# Patient Record
Sex: FEMALE | Race: WHITE | NOT HISPANIC OR LATINO | URBAN - METROPOLITAN AREA
[De-identification: names, ages, dates, MRNs, and addresses within clinical notes are randomized per-mention and may not be internally consistent; named-entity substitution may affect disease eponyms.]

---

## 2021-02-23 ENCOUNTER — OUTPATIENT (OUTPATIENT)
Dept: OUTPATIENT SERVICES | Facility: HOSPITAL | Age: 31
LOS: 1 days | End: 2021-02-23
Payer: COMMERCIAL

## 2021-02-23 DIAGNOSIS — Z01.818 ENCOUNTER FOR OTHER PREPROCEDURAL EXAMINATION: ICD-10-CM

## 2021-02-23 LAB
APTT BLD: 23.9 SEC — LOW (ref 27.5–35.5)
BLD GP AB SCN SERPL QL: NEGATIVE — SIGNIFICANT CHANGE UP
BLD GP AB SCN SERPL QL: NEGATIVE — SIGNIFICANT CHANGE UP
HCT VFR BLD CALC: 40.1 % — SIGNIFICANT CHANGE UP (ref 34.5–45)
HGB BLD-MCNC: 13.1 G/DL — SIGNIFICANT CHANGE UP (ref 11.5–15.5)
INR BLD: 0.83 — LOW (ref 0.88–1.16)
MCHC RBC-ENTMCNC: 32.5 PG — SIGNIFICANT CHANGE UP (ref 27–34)
MCHC RBC-ENTMCNC: 32.7 GM/DL — SIGNIFICANT CHANGE UP (ref 32–36)
MCV RBC AUTO: 99.5 FL — SIGNIFICANT CHANGE UP (ref 80–100)
NRBC # BLD: 0 /100 WBCS — SIGNIFICANT CHANGE UP (ref 0–0)
PLATELET # BLD AUTO: 192 K/UL — SIGNIFICANT CHANGE UP (ref 150–400)
PROTHROM AB SERPL-ACNC: 10.1 SEC — LOW (ref 10.6–13.6)
RBC # BLD: 4.03 M/UL — SIGNIFICANT CHANGE UP (ref 3.8–5.2)
RBC # FLD: 11.5 % — SIGNIFICANT CHANGE UP (ref 10.3–14.5)
RH IG SCN BLD-IMP: POSITIVE — SIGNIFICANT CHANGE UP
RH IG SCN BLD-IMP: POSITIVE — SIGNIFICANT CHANGE UP
WBC # BLD: 8.69 K/UL — SIGNIFICANT CHANGE UP (ref 3.8–10.5)
WBC # FLD AUTO: 8.69 K/UL — SIGNIFICANT CHANGE UP (ref 3.8–10.5)

## 2021-02-23 PROCEDURE — 86780 TREPONEMA PALLIDUM: CPT

## 2021-02-23 PROCEDURE — 86850 RBC ANTIBODY SCREEN: CPT

## 2021-02-23 PROCEDURE — 85610 PROTHROMBIN TIME: CPT

## 2021-02-23 PROCEDURE — 86901 BLOOD TYPING SEROLOGIC RH(D): CPT

## 2021-02-23 PROCEDURE — 86900 BLOOD TYPING SEROLOGIC ABO: CPT

## 2021-02-23 PROCEDURE — 85027 COMPLETE CBC AUTOMATED: CPT

## 2021-02-23 PROCEDURE — 85730 THROMBOPLASTIN TIME PARTIAL: CPT

## 2021-02-24 LAB — T PALLIDUM AB TITR SER: NEGATIVE — SIGNIFICANT CHANGE UP

## 2021-02-25 ENCOUNTER — TRANSCRIPTION ENCOUNTER (OUTPATIENT)
Age: 31
End: 2021-02-25

## 2021-02-26 ENCOUNTER — RESULT REVIEW (OUTPATIENT)
Age: 31
End: 2021-02-26

## 2021-02-26 ENCOUNTER — INPATIENT (INPATIENT)
Facility: HOSPITAL | Age: 31
LOS: 3 days | Discharge: HOME | End: 2021-03-02
Attending: OBSTETRICS & GYNECOLOGY | Admitting: OBSTETRICS & GYNECOLOGY
Payer: COMMERCIAL

## 2021-02-26 VITALS — HEIGHT: 66 IN | WEIGHT: 166.01 LBS

## 2021-02-26 LAB
ALBUMIN SERPL ELPH-MCNC: 1.9 G/DL — LOW (ref 3.3–5)
ALP SERPL-CCNC: 129 U/L — HIGH (ref 40–120)
ALT FLD-CCNC: SIGNIFICANT CHANGE UP (ref 10–45)
ANION GAP SERPL CALC-SCNC: 10 MMOL/L — SIGNIFICANT CHANGE UP (ref 5–17)
APTT BLD: >200 SEC — CRITICAL HIGH (ref 27.5–35.5)
AST SERPL-CCNC: SIGNIFICANT CHANGE UP (ref 10–40)
BASE EXCESS BLDA CALC-SCNC: -2.6 MMOL/L — LOW (ref -2–3)
BASOPHILS # BLD AUTO: 0.04 K/UL — SIGNIFICANT CHANGE UP (ref 0–0.2)
BASOPHILS NFR BLD AUTO: 0.3 % — SIGNIFICANT CHANGE UP (ref 0–2)
BILIRUB SERPL-MCNC: 0.2 MG/DL — SIGNIFICANT CHANGE UP (ref 0.2–1.2)
BLD GP AB SCN SERPL QL: NEGATIVE — SIGNIFICANT CHANGE UP
BUN SERPL-MCNC: 10 MG/DL — SIGNIFICANT CHANGE UP (ref 7–23)
CALCIUM SERPL-MCNC: 8.4 MG/DL — SIGNIFICANT CHANGE UP (ref 8.4–10.5)
CHLORIDE SERPL-SCNC: 102 MMOL/L — SIGNIFICANT CHANGE UP (ref 96–108)
CK MB CFR SERPL CALC: 1.6 NG/ML — SIGNIFICANT CHANGE UP (ref 0–6.7)
CK SERPL-CCNC: 120 U/L — SIGNIFICANT CHANGE UP (ref 25–170)
CO2 SERPL-SCNC: 20 MMOL/L — LOW (ref 22–31)
CREAT SERPL-MCNC: 0.8 MG/DL — SIGNIFICANT CHANGE UP (ref 0.5–1.3)
EOSINOPHIL # BLD AUTO: 0.04 K/UL — SIGNIFICANT CHANGE UP (ref 0–0.5)
EOSINOPHIL NFR BLD AUTO: 0.3 % — SIGNIFICANT CHANGE UP (ref 0–6)
FIBRINOGEN PPP-MCNC: 401 MG/DL — SIGNIFICANT CHANGE UP (ref 258–438)
GAS PNL BLDA: SIGNIFICANT CHANGE UP
GLUCOSE BLDC GLUCOMTR-MCNC: 78 MG/DL — SIGNIFICANT CHANGE UP (ref 70–99)
GLUCOSE BLDC GLUCOMTR-MCNC: 88 MG/DL — SIGNIFICANT CHANGE UP (ref 70–99)
GLUCOSE SERPL-MCNC: 102 MG/DL — HIGH (ref 70–99)
HCO3 BLDA-SCNC: 22 MMOL/L — SIGNIFICANT CHANGE UP (ref 21–28)
HCT VFR BLD CALC: 31.8 % — LOW (ref 34.5–45)
HCT VFR BLD CALC: 35 % — SIGNIFICANT CHANGE UP (ref 34.5–45)
HGB BLD-MCNC: 11.2 G/DL — LOW (ref 11.5–15.5)
HGB BLD-MCNC: 12.8 G/DL — SIGNIFICANT CHANGE UP (ref 11.5–15.5)
IMM GRANULOCYTES NFR BLD AUTO: 0.7 % — SIGNIFICANT CHANGE UP (ref 0–1.5)
INR BLD: 0.9 — SIGNIFICANT CHANGE UP (ref 0.88–1.16)
LACTATE SERPL-SCNC: 1.5 MMOL/L — SIGNIFICANT CHANGE UP (ref 0.5–2)
LDH SERPL L TO P-CCNC: 200 U/L — SIGNIFICANT CHANGE UP (ref 50–242)
LYMPHOCYTES # BLD AUTO: 1.47 K/UL — SIGNIFICANT CHANGE UP (ref 1–3.3)
LYMPHOCYTES # BLD AUTO: 11.5 % — LOW (ref 13–44)
MCHC RBC-ENTMCNC: 34.4 PG — HIGH (ref 27–34)
MCHC RBC-ENTMCNC: 35.2 GM/DL — SIGNIFICANT CHANGE UP (ref 32–36)
MCHC RBC-ENTMCNC: 35.7 PG — HIGH (ref 27–34)
MCHC RBC-ENTMCNC: 36.6 GM/DL — HIGH (ref 32–36)
MCV RBC AUTO: 97.5 FL — SIGNIFICANT CHANGE UP (ref 80–100)
MCV RBC AUTO: 97.5 FL — SIGNIFICANT CHANGE UP (ref 80–100)
MONOCYTES # BLD AUTO: 0.59 K/UL — SIGNIFICANT CHANGE UP (ref 0–0.9)
MONOCYTES NFR BLD AUTO: 4.6 % — SIGNIFICANT CHANGE UP (ref 2–14)
NEUTROPHILS # BLD AUTO: 10.59 K/UL — HIGH (ref 1.8–7.4)
NEUTROPHILS NFR BLD AUTO: 82.6 % — HIGH (ref 43–77)
NRBC # BLD: 0 /100 WBCS — SIGNIFICANT CHANGE UP (ref 0–0)
NRBC # BLD: 0 /100 WBCS — SIGNIFICANT CHANGE UP (ref 0–0)
PCO2 BLDA: 36 MMHG — SIGNIFICANT CHANGE UP (ref 32–45)
PH BLDA: 7.4 — SIGNIFICANT CHANGE UP (ref 7.35–7.45)
PLATELET # BLD AUTO: 212 K/UL — SIGNIFICANT CHANGE UP (ref 150–400)
PLATELET # BLD AUTO: 221 K/UL — SIGNIFICANT CHANGE UP (ref 150–400)
PO2 BLDA: 210 MMHG — HIGH (ref 83–108)
POTASSIUM SERPL-MCNC: 4.3 MMOL/L — SIGNIFICANT CHANGE UP (ref 3.5–5.3)
POTASSIUM SERPL-SCNC: 4.3 MMOL/L — SIGNIFICANT CHANGE UP (ref 3.5–5.3)
PROT SERPL-MCNC: 5.1 G/DL — LOW (ref 6–8.3)
PROTHROM AB SERPL-ACNC: 10.9 SEC — SIGNIFICANT CHANGE UP (ref 10.6–13.6)
RBC # BLD: 3.26 M/UL — LOW (ref 3.8–5.2)
RBC # BLD: 3.59 M/UL — LOW (ref 3.8–5.2)
RBC # FLD: 11.2 % — SIGNIFICANT CHANGE UP (ref 10.3–14.5)
RBC # FLD: 11.5 % — SIGNIFICANT CHANGE UP (ref 10.3–14.5)
RH IG SCN BLD-IMP: POSITIVE — SIGNIFICANT CHANGE UP
SAO2 % BLDA: 99 % — SIGNIFICANT CHANGE UP (ref 95–100)
SODIUM SERPL-SCNC: 132 MMOL/L — LOW (ref 135–145)
TROPONIN T SERPL-MCNC: <0.01 NG/ML — SIGNIFICANT CHANGE UP (ref 0–0.01)
URATE SERPL-MCNC: 4.7 MG/DL — SIGNIFICANT CHANGE UP (ref 2.5–7)
WBC # BLD: 12.82 K/UL — HIGH (ref 3.8–10.5)
WBC # BLD: 14.35 K/UL — HIGH (ref 3.8–10.5)
WBC # FLD AUTO: 12.82 K/UL — HIGH (ref 3.8–10.5)
WBC # FLD AUTO: 14.35 K/UL — HIGH (ref 3.8–10.5)

## 2021-02-26 PROCEDURE — 88307 TISSUE EXAM BY PATHOLOGIST: CPT | Mod: 26

## 2021-02-26 PROCEDURE — 0042T: CPT

## 2021-02-26 PROCEDURE — 70498 CT ANGIOGRAPHY NECK: CPT | Mod: 26

## 2021-02-26 PROCEDURE — 70450 CT HEAD/BRAIN W/O DYE: CPT | Mod: 26,59

## 2021-02-26 PROCEDURE — 70496 CT ANGIOGRAPHY HEAD: CPT | Mod: 26

## 2021-02-26 RX ORDER — MAGNESIUM SULFATE 500 MG/ML
4 VIAL (ML) INJECTION ONCE
Refills: 0 | Status: DISCONTINUED | OUTPATIENT
Start: 2021-02-26 | End: 2021-02-26

## 2021-02-26 RX ORDER — FAMOTIDINE 10 MG/ML
20 INJECTION INTRAVENOUS ONCE
Refills: 0 | Status: COMPLETED | OUTPATIENT
Start: 2021-02-26 | End: 2021-02-26

## 2021-02-26 RX ORDER — MAGNESIUM SULFATE 500 MG/ML
2 VIAL (ML) INJECTION
Qty: 40 | Refills: 0 | Status: DISCONTINUED | OUTPATIENT
Start: 2021-02-26 | End: 2021-02-27

## 2021-02-26 RX ORDER — MAGNESIUM SULFATE 500 MG/ML
2 VIAL (ML) INJECTION ONCE
Refills: 0 | Status: DISCONTINUED | OUTPATIENT
Start: 2021-02-26 | End: 2021-02-27

## 2021-02-26 RX ORDER — OXYTOCIN 10 UNIT/ML
333.33 VIAL (ML) INJECTION
Qty: 20 | Refills: 0 | Status: COMPLETED | OUTPATIENT
Start: 2021-02-26 | End: 2021-02-26

## 2021-02-26 RX ORDER — CITRIC ACID/SODIUM CITRATE 300-500 MG
30 SOLUTION, ORAL ORAL ONCE
Refills: 0 | Status: COMPLETED | OUTPATIENT
Start: 2021-02-26 | End: 2021-02-26

## 2021-02-26 RX ORDER — CEFAZOLIN SODIUM 1 G
2000 VIAL (EA) INJECTION ONCE
Refills: 0 | Status: COMPLETED | OUTPATIENT
Start: 2021-02-26 | End: 2021-02-26

## 2021-02-26 RX ORDER — CHLORHEXIDINE GLUCONATE 213 G/1000ML
1 SOLUTION TOPICAL
Refills: 0 | Status: DISCONTINUED | OUTPATIENT
Start: 2021-02-26 | End: 2021-02-27

## 2021-02-26 RX ORDER — MAGNESIUM SULFATE 500 MG/ML
4 VIAL (ML) INJECTION ONCE
Refills: 0 | Status: DISCONTINUED | OUTPATIENT
Start: 2021-02-26 | End: 2021-02-27

## 2021-02-26 RX ORDER — SODIUM CHLORIDE 9 MG/ML
1000 INJECTION, SOLUTION INTRAVENOUS
Refills: 0 | Status: DISCONTINUED | OUTPATIENT
Start: 2021-02-26 | End: 2021-02-27

## 2021-02-26 RX ORDER — METOCLOPRAMIDE HCL 10 MG
10 TABLET ORAL ONCE
Refills: 0 | Status: DISCONTINUED | OUTPATIENT
Start: 2021-02-26 | End: 2021-02-27

## 2021-02-26 RX ORDER — INFLUENZA VIRUS VACCINE 15; 15; 15; 15 UG/.5ML; UG/.5ML; UG/.5ML; UG/.5ML
0.5 SUSPENSION INTRAMUSCULAR ONCE
Refills: 0 | Status: DISCONTINUED | OUTPATIENT
Start: 2021-02-26 | End: 2021-03-02

## 2021-02-26 RX ORDER — MAGNESIUM SULFATE 500 MG/ML
2 VIAL (ML) INJECTION
Qty: 40 | Refills: 0 | Status: DISCONTINUED | OUTPATIENT
Start: 2021-02-26 | End: 2021-02-26

## 2021-02-26 RX ORDER — MAGNESIUM SULFATE 500 MG/ML
2 VIAL (ML) INJECTION ONCE
Refills: 0 | Status: DISCONTINUED | OUTPATIENT
Start: 2021-02-26 | End: 2021-02-26

## 2021-02-26 RX ORDER — BUPIVACAINE 13.3 MG/ML
20 INJECTION, SUSPENSION, LIPOSOMAL INFILTRATION ONCE
Refills: 0 | Status: DISCONTINUED | OUTPATIENT
Start: 2021-02-26 | End: 2021-03-02

## 2021-02-26 RX ORDER — SODIUM CHLORIDE 9 MG/ML
1000 INJECTION, SOLUTION INTRAVENOUS ONCE
Refills: 0 | Status: COMPLETED | OUTPATIENT
Start: 2021-02-26 | End: 2021-02-26

## 2021-02-26 RX ADMIN — FAMOTIDINE 20 MILLIGRAM(S): 10 INJECTION INTRAVENOUS at 15:27

## 2021-02-26 RX ADMIN — SODIUM CHLORIDE 2000 MILLILITER(S): 9 INJECTION, SOLUTION INTRAVENOUS at 13:42

## 2021-02-26 RX ADMIN — SODIUM CHLORIDE 125 MILLILITER(S): 9 INJECTION, SOLUTION INTRAVENOUS at 15:15

## 2021-02-26 RX ADMIN — Medication 1000 MILLIUNIT(S)/MIN: at 19:00

## 2021-02-26 RX ADMIN — Medication 30 MILLILITER(S): at 15:27

## 2021-02-26 RX ADMIN — Medication 100 MILLIGRAM(S): at 15:27

## 2021-02-26 NOTE — CHART NOTE - NSCHARTNOTEFT_GEN_A_CORE
31yo  had uncomplicated c/s for breech presentation with spinal anesthesia, EBL 800cc. Pt received additional sedation by anesthesia (versed) for discomfort after delivery of baby and for abdominal muscle relaxation to better visualize operative field. Pt was given TAP Block (with reglan) while in the OR for additional pain control. During and for several minutes after the TAP Block, pt was conversational and alert and oriented x 3. While preparing move patient to PACU bed to leave OR, pt reported that she felt "weird." She denied feeling dizzy or hearing ringing in her ears. Patient was moved to PACU bed. Within seconds patient's eyes became unfocused and she became incoherent in her speech. She was quickly moved to PACU and vitals were taken. At 17:10 she had elevated BP with systolic in high 130's. She then became completely unresponsive - eyes open but unable to follow commands, answer questions, or make eye contact. Pt was saturating 97% on RA but was started on O2. Dr. Licona and Dr. Meeks were present at bedside. Dr. Chowdhury called Dr. Ng was called to bedside, who then quickly called Dr. Cotto. At 17:15, a stoke code and adult rapid response were called. Within 5 minutes the teams arrived. Initial fingerstick was 88. At 17:25 BP was 145/75, at this time patient received interlipid bolus. At 17:30, CT stroke protocol was ordered and full labs were ordered. At 17:33, CT on 3rd floor confirmed they were open and expecting us. At 17:36 labs were run up to the lab.  At 17:38, team left for CT scanner. During this time, pt was never hemodynamically unstable. Pt continued to be unresponsive to verbal or tactile stimuli, including to pain. At 17:41, arrived at CT scanner. SICU team was called and bed was confirmed. By the time the CTs (head noncontrast, angio) were completed at 18:05, pt began to become responsive with slurred words and word finding difficulty. Pt was able to confirm her birthday while in the hallway outside CT scanner. At this time, horizontal and vertical nystagmus were elicited. Pt started following commands. Pt was brought up to SICU and at this time pt was following all commands, although she continued to have word finding difficulties and was acting altered/"loopy". Fundus was firm with minimal lochia.  updated on pt's status by Dr. Gao. Signed out to SICU with plan to start another bag of pitocin. Will continue to follow patient in SICU.

## 2021-02-26 NOTE — LACTATION INITIAL EVALUATION - LACTATION INTERVENTIONS
Set up with breast pump and provided education accordingly./initiate hand expression routine/initiate dual electric pump routine

## 2021-02-26 NOTE — LACTATION INITIAL EVALUATION - INTERVENTION OUTCOME
Follow up needed to complete breastfeeding education./verbalizes understanding/demonstrates understanding of teaching/good return demonstration/Lactation team to follow up

## 2021-02-26 NOTE — CONSULT NOTE ADULT - SUBJECTIVE AND OBJECTIVE BOX
SICU Consultation Note  =====================================================  HPI: 30y Female  HPI: 31 y/o F  with no PMH, presents to the hospital for scheduled  for breech presentation. Pregnancy was complicated with GDM. During  she received spinal anesthesia and TAP. While in PACU she immediately became obtunded with slurred speech, while maintaining her airway. At 17:10 she had elevated BP with systolic in high 130's. She then became completely unresponsive - eyes open but unable to follow commands, answer questions, or make eye contact. Pt was saturating 97% on RA but was started on O2,. Stroke code was called and patient had a full set of labs and was taken to CT scan of head and neck with no findings. Patient admitted to SICU for close monitoring overnight.        PAST MEDICAL & SURGICAL HISTORY:    Home Meds: Home Medications:  levothyroxine 88 mcg (0.088 mg) oral tablet: 1 tab(s) orally once a day (2021 15:21)  Prenatal 1 oral capsule: 1 cap(s) orally once a day (2021 15:21)    Allergies: Allergies    Allergy Status Unknown    Intolerances      Soc:   Advanced Directives: Presumed Full Code     ROS:    REVIEW OF SYSTEMS    [x] A ten-point review of systems was otherwise negative except as noted.        CURRENT MEDICATIONS:   --------------------------------------------------------------------------------------  Neurologic Medications  magnesium sulfate Infusion 2 Gm/Hr IV Continuous <Continuous>  metoclopramide Injectable 10 milliGRAM(s) IV Push once PRN Nausea and/or Vomiting    Respiratory Medications    Cardiovascular Medications    Gastrointestinal Medications  lactated ringers. 1000 milliLiter(s) IV Continuous <Continuous>  magnesium sulfate  IVPB 4 Gram(s) IV Intermittent once  magnesium sulfate  IVPB 4 Gram(s) IV Intermittent once  magnesium sulfate  IVPB 2 Gram(s) IV Intermittent once    Genitourinary Medications  oxytocin Infusion 333.333 milliUNIT(s)/Min IV Continuous <Continuous>    Hematologic/Oncologic Medications  influenza   Vaccine 0.5 milliLiter(s) IntraMuscular once    Antimicrobial/Immunologic Medications    Endocrine/Metabolic Medications    Topical/Other Medications  BUpivacaine liposome 1.3% Injectable (no eMAR) 20 milliLiter(s) Local Injection once  chlorhexidine 2% Cloths 1 Application(s) Topical <User Schedule>    --------------------------------------------------------------------------------------    VITAL SIGNS, INS/OUTS (last 24 hours):  --------------------------------------------------------------------------------------  ICU Vital Signs Last 24 Hrs  T(C): 36.9 (2021 18:23), Max: 36.9 (2021 18:23)  T(F): 98.4 (2021 18:23), Max: 98.4 (2021 18:23)  HR: 86 (2021 19:18) (79 - 87)  BP: 141/82 (2021 18:53) (141/82 - 149/67)  BP(mean): 104 (2021 18:53) (97 - 105)  ABP: --  ABP(mean): --  RR: 19 (2021 19:18) (18 - 24)  SpO2: 96% (2021 19:18) (96% - 98%)    I&O's Summary    2021 07:01  -  2021 19:33  --------------------------------------------------------  IN: 125 mL / OUT: 0 mL / NET: 125 mL      --------------------------------------------------------------------------------------    EXAM:  General: no distress, lying in bed  Neuro: A&Ox3, able to follow commands, normal sensation, moves all extremities, PERRL but sluggish, vertical and horizontal nystagmus  HEENT: NC/AT, EOMI, MMM  CV: sinus rhythm, no murmurs  Resp: Non-labored breathing on RA, CTA-B  Abdomen: soft, post-partum, non-tender,  site covered in dressing  : Masters in place, and pads  Extremities: non-edematous, SCDs, in place  Skin: warm, no rashes    LABS  --------------------------------------------------------------------------------------  Labs:  CAPILLARY BLOOD GLUCOSE      POCT Blood Glucose.: 88 mg/dL (2021 17:27)  POCT Blood Glucose.: 78 mg/dL (2021 14:19)                          12.8   12.82 )-----------( 212      ( 2021 17:44 )             35.0       Auto Neutrophil %: 82.6 % (21 @ 17:44)  Auto Immature Granulocyte %: 0.7 % (21 @ 17:44)        132<L>  |  102  |  See Note  ----------------------------<  See Note  4.3   |  20<L>  |  0.80      eGFR if Non African American: 98 mL/min/1.73M2 (21 @ 17:45)      LFTs:             5.1  | See Note| See Note    ------------------[129     ( 2021 17:45 )  See Note| x    | See Note       Lipase:x      Amylase:x         Lactate, Blood: 1.5 mmol/L (21 @ 17:42)    ABG - ( 2021 17:42 )  pH: 7.40  /  pCO2: 36    /  pO2: 210   / HCO3: 22    / Base Excess: -2.6  /  SaO2: 99                Coags:     10.9   ----< 0.90    ( 2021 17:44 )     >200.0       CARDIAC MARKERS ( 2021 17:43 )  x     / <0.01 ng/mL / 120 U/L / x     / 1.6 ng/mL                --------------------------------------------------------------------------------------        IMAGING RESULTS  EXAM: CT PERFUSION W MAPS IC    EXAM: CT ANGIO NECK (W)AW IC    EXAM: CT ANGIO BRAIN (W) IC    PROCEDURE DATE: 2021        INTERPRETATION: PROCEDURE:  CT perfusion with contrast.  CTA brain with intravenous contrast.    INDICATION: Unresponsive after  and TAP block    TECHNIQUE:    After the bolus administration of 80 cc Optiray-350, CT perfusion is obtained as per [facility] protocol in two data sets. Perfusion maps are provided.    Multiple axial thin section were obtained through the [place] [facility] [place] following the additional intravenous bolus injection of 85 cc Optiray-350. MIP series are provided.    COMPARISON: None    FINDINGS:    There is limited contrast filling of the arterial system on the CTA study. Despite this limitation, the internal carotid arteries at the skull base appear grossly patent and symmetric caliber, as they do at their intracranial supraclinoid segments. The anterior and middle cerebral arteries appear grossly patent and symmetric at their first and second order branches without point of occlusion or high-grade stenosis demonstrated. More peripheral vasculature is poorly defined. The intracranial vertebral arteries appear patent and symmetric caliber. 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 The P2 segments appear grossly patent. No large intracranial aneurysm is shown, though small aneurysm(s) may certainly be missed given suboptimal contrast bolus.    On CT perfusion, there is no reported deficit in cerebral blood flow or cerebral blood volume. There is widespread reported Tmax elevation totalling 164 cc. This is likely artifactual in the presence of patient motion as well as irregular AIF and VOF curves, rendering the study as nondiagnostic.      IMPRESSION:    Limited CTA examination of the brain. No proximal arterial occlusion or high-grade stenosis is demonstrated. There is poor visualization of peripheral branching (3rd and 4th order segments) due to suboptimal contrast bolus.    Nondiagnostic CT perfusion study.    ---------------------------------------------------------------------    PROCEDURE: CTA neck with intravenous contrast.    INDICATION: Unresponsive after  and TAP block    TECHNIQUE: Multiple axial thin section were obtained through the neck following the intravenous bolus injection of Optiray-350 as above. MIP series are provided.    COMPARISON: None    FINDINGS:    The aortic arch is normal size and shows patency, with normal 3 vessel configuration.    Both common carotid arteries are patent up the the bifurcations.    The right internal carotid artery is patent up to the skull base, without hemodynamically significant stenosis or occlusion. The distal right ICA in the neck is tortuous.    The left internal carotid artery is patent up to the skull base, without hemodynamically significant stenosis or occlusion.    The cervical vertebral arteries are patent throughout, without hemodynamically significant stenosis or occlusion.    Incidentally, there is degenerative disc changes of the cervical spine at the C6-7 level and there is mild reversal of cervical lordosis that is likely positional.      IMPRESSION:    No carotid or vertebral artery steno-occlusive disease in the neck.              Thank you for the opportunity to participate in the care of this patient.      YULISSA OH MD; Attending Radiologist  This document has been electronically signed. 2021 6:31PM    ASSESSMENT:  30 no PMH, admitted for  due to breech presentation under spinal anesthesia and TAP block, became somnolent while in PACU with slurred speech, stroke code called, CT scan with no significant findings. transferred to SICU for close monitoring     NEURO: no sedation  CV: stable, maintain BP< 160/110  PULM: room air  GI/FEN: NPO with ice chips LR @125 after pitocin drip finishes  GYN: pitocin gtts  : masters  ENDO: ISS, levothyroxine 88  ID: none; s/p Ancef 2g preop  PPX: SCDs   LINES: PIVs,   WOUNDS/DRAINS:

## 2021-02-26 NOTE — CONSULT NOTE ADULT - ASSESSMENT
29yo  had uncomplicated c/s for breech presentation with spinal anesthesia, EBL 800cc. Pt received additional sedation by anesthesia (versed) for discomfort after delivery of baby and for abdominal muscle relaxation to better visualize operative field. Pt was given TAP Block (with reglan) while in the OR for additional pain control. During and for several minutes after the TAP Block, pt was conversational and alert and oriented x 3. While preparing move patient to PACU bed to leave OR, pt reported that she felt "weird." She then became completely unresponsive - eyes open but unable to follow commands, answer questions, or make eye contact. Stroke code called. All imaging negative, CTA poor quality. Patient started to wake up after imaging. Answering questions and following commands but still groggy. Found to have vertical and horizontal nystagmus. Admitted to SICU.     Recommend:    - MR venogram brain  - VEEG r/o seizure  - Q1h stroke neuro checks   - STAT CTH for any acute changes in mental status   - Q1h vital signs   - Defer to OB team for DVT ppx s/p c section   - Defer to ICU team for medical management

## 2021-02-26 NOTE — CONSULT NOTE ADULT - ATTENDING COMMENTS
In summary 29 yo post partum had an acute onset of confusion after the c/s procedure, details as above. Code stroke was called, she did not qualify for tPA due to recent surgery. CTh and CTA H&N and CTP were performed. CTH was unremarkable, CTA was a poor quality study, but it ruled out any LVO, thus no MT. CTP was suboptimal and mainly artifact. On exam today she is back to baseline and does not have any symptoms.  Would still r/o cerebral sinus venous thrombosis, although less likely, but it's more common in pregnant women  Plan:  [] MRV w/o contrast

## 2021-02-26 NOTE — CONSULT NOTE ADULT - SUBJECTIVE AND OBJECTIVE BOX
**STROKE CODE CONSULT NOTE**    Last known well time/Time of onset of symptoms: 17:00    HPI: 31yo  had uncomplicated c/s for breech presentation with spinal anesthesia, EBL 800cc. Pt received additional sedation by anesthesia (versed) for discomfort after delivery of baby and for abdominal muscle relaxation to better visualize operative field. Pt was given TAP Block (with reglan) while in the OR for additional pain control. During and for several minutes after the TAP Block, pt was conversational and alert and oriented x 3. While preparing move patient to PACU bed to leave OR, pt reported that she felt "weird." She denied feeling dizzy or hearing ringing in her ears. Patient was moved to PACU bed. Within seconds patient's eyes became unfocused and she became incoherent in her speech. She was quickly moved to PACU and vitals were taken. At 17:10 she had elevated BP with systolic in high 130's. She then became completely unresponsive - eyes open but unable to follow commands, answer questions, or make eye contact. Pt was saturating 97% on RA but was started on O2. Dr. Licona and Dr. Meeks were present at bedside. Dr. Chowdhury called Dr. Ng was called to bedside, who then quickly called Dr. Cotto. At 17:15, a stoke code and adult rapid response were called. Within 5 minutes the teams arrived. Initial fingerstick was 88. At 17:25 BP was 145/75, at this time patient received interlipid bolus. At 17:30, CT stroke protocol was ordered and full labs were ordered. At 17:33, CT on 3rd floor confirmed they were open and expecting us. At 17:36 labs were run up to the lab.  At 17:38, team left for CT scanner. During this time, pt was never hemodynamically unstable. Pt continued to be unresponsive to verbal or tactile stimuli, including to pain. At 17:41, arrived at CT scanner. SICU team was called and bed was confirmed. By the time the CTs (head noncontrast, angio) were completed at 18:05, pt began to become responsive with slurred words and word finding difficulty. Pt was able to confirm her birthday while in the hallway outside CT scanner. At this time, horizontal and vertical nystagmus were elicited. Pt started following commands. Pt was brought up to SICU and at this time pt was following all commands, although she continued to have word finding difficulties and was acting altered/"loopy". Fundus was firm with minimal lochia.     T(C): 36.9 (21 @ 18:23), Max: 36.9 (21 @ 18:23)  HR: 79 (21 @ 18:53) (79 - 87)  BP: 141/82 (21 @ 18:53) (141/82 - 149/67)  RR: 19 (21 @ 18:53) (18 - 24)  SpO2: 97% (21 @ 18:53) (97% - 98%)    PAST MEDICAL & SURGICAL HISTORY:      FAMILY HISTORY:      SOCIAL HISTORY:    ROS: ***  Unable to obtain due to patient's mental status     MEDICATIONS  (STANDING):  BUpivacaine liposome 1.3% Injectable (no eMAR) 20 milliLiter(s) Local Injection once  chlorhexidine 2% Cloths 1 Application(s) Topical <User Schedule>  influenza   Vaccine 0.5 milliLiter(s) IntraMuscular once  lactated ringers. 1000 milliLiter(s) (125 mL/Hr) IV Continuous <Continuous>  magnesium sulfate  IVPB 2 Gram(s) IV Intermittent once  magnesium sulfate  IVPB 4 Gram(s) IV Intermittent once  magnesium sulfate  IVPB 4 Gram(s) IV Intermittent once  magnesium sulfate Infusion 2 Gm/Hr (50 mL/Hr) IV Continuous <Continuous>  oxytocin Infusion 333.333 milliUNIT(s)/Min (1000 mL/Hr) IV Continuous <Continuous>    MEDICATIONS  (PRN):  metoclopramide Injectable 10 milliGRAM(s) IV Push once PRN Nausea and/or Vomiting    Allergies    Allergy Status Unknown    Intolerances      Vital Signs Last 24 Hrs  T(C): 36.9 (2021 18:23), Max: 36.9 (2021 18:23)  T(F): 98.4 (2021 18:23), Max: 98.4 (2021 18:23)  HR: 79 (2021 18:53) (79 - 87)  BP: 141/82 (2021 18:53) (141/82 - 149/67)  BP(mean): 104 (2021 18:53) (97 - 105)  RR: 19 (2021 18:53) (18 - 24)  SpO2: 97% (2021 18:53) (97% - 98%)    Neurologic:  -Mental status: Unresponsive to verbal or noxious stimuli  -Cranial nerves:   II: Eyes open blink to threat absent   III, IV, VI: Vertical and horizontal nystagmus Pupils equally round and reactive to light  V:  Facial sensation V1-V3 equal and intact   VII: Face is symmetric with normal eye closure and smile  VIII: Hearing is bilaterally intact to finger rub  Motor: Increased tone through out.   Sensation: No response to verbal or noxious stimuli   Coordination: No dysmetria on finger-to-nose and heel-to-shin bilaterally  Reflexes: Downgoing toes bilaterally       NIHSS:26    Fingerstick Blood Glucose: CAPILLARY BLOOD GLUCOSE      POCT Blood Glucose.: 88 mg/dL (2021 17:27)    LABS:                        12.8   12.82 )-----------( 212      ( 2021 17:44 )             35.0         132<L>  |  102  |  See Note  ----------------------------<  See Note  4.3   |  20<L>  |  0.80    Ca    See Note      2021 17:45    TPro  5.1<L>  /  Alb  See Note  /  TBili  See Note  /  DBili  x   /  AST  See Note  /  ALT  See Note  /  AlkPhos  129<H>      PT/INR - ( 2021 17:44 )   PT: 10.9 sec;   INR: 0.90          PTT - ( 2021 17:44 )  PTT:>200.0 sec  CARDIAC MARKERS ( 2021 17:43 )  x     / <0.01 ng/mL / 120 U/L / x     / 1.6 ng/mL              **STROKE CODE CONSULT NOTE**    Last known well time/Time of onset of symptoms: 17:00    HPI: 29yo  had uncomplicated c/s for breech presentation with spinal anesthesia, EBL 800cc. Pt received additional sedation by anesthesia (versed) for discomfort after delivery of baby and for abdominal muscle relaxation to better visualize operative field. Pt was given TAP Block (with reglan) while in the OR for additional pain control. During and for several minutes after the TAP Block, pt was conversational and alert and oriented x 3. While preparing move patient to PACU bed to leave OR, pt reported that she felt "weird." She denied feeling dizzy or hearing ringing in her ears. Patient was moved to PACU bed. Within seconds patient's eyes became unfocused and she became incoherent in her speech. She was quickly moved to PACU and vitals were taken. At 17:10 she had elevated BP with systolic in high 130's. She then became completely unresponsive - eyes open but unable to follow commands, answer questions, or make eye contact. Pt was saturating 97% on RA but was started on O2. Dr. Licona and Dr. Meeks were present at bedside. Dr. Chowdhury called Dr. Ng was called to bedside, who then quickly called Dr. Cotto. At 17:15, a stoke code and adult rapid response were called. Within 5 minutes the teams arrived. Initial fingerstick was 88. At 17:25 BP was 145/75, at this time patient received interlipid bolus. At 17:30, CT stroke protocol was ordered and full labs were ordered. At 17:33, CT on 3rd floor confirmed they were open and expecting us. At 17:36 labs were run up to the lab.  At 17:38, team left for CT scanner. During this time, pt was never hemodynamically unstable. Pt continued to be unresponsive to verbal or tactile stimuli, including to pain. At 17:41, arrived at CT scanner. SICU team was called and bed was confirmed. By the time the CTs (head noncontrast, angio) were completed at 18:05, pt began to become responsive with slurred words and word finding difficulty. Pt was able to confirm her birthday while in the hallway outside CT scanner. At this time, horizontal and vertical nystagmus were elicited. Pt started following commands. Pt was brought up to SICU and at this time pt was following all commands, although she continued to have word finding difficulties and was acting altered/"loopy".    T(C): 36.9 (21 @ 18:23), Max: 36.9 (21 @ 18:23)  HR: 79 (21 @ 18:53) (79 - 87)  BP: 141/82 (21 @ 18:53) (141/82 - 149/67)  RR: 19 (21 @ 18:53) (18 - 24)  SpO2: 97% (21 @ 18:53) (97% - 98%)    PAST MEDICAL & SURGICAL HISTORY:      FAMILY HISTORY:      SOCIAL HISTORY:    ROS: ***  Unable to obtain due to patient's mental status     MEDICATIONS  (STANDING):  BUpivacaine liposome 1.3% Injectable (no eMAR) 20 milliLiter(s) Local Injection once  chlorhexidine 2% Cloths 1 Application(s) Topical <User Schedule>  influenza   Vaccine 0.5 milliLiter(s) IntraMuscular once  lactated ringers. 1000 milliLiter(s) (125 mL/Hr) IV Continuous <Continuous>  magnesium sulfate  IVPB 2 Gram(s) IV Intermittent once  magnesium sulfate  IVPB 4 Gram(s) IV Intermittent once  magnesium sulfate  IVPB 4 Gram(s) IV Intermittent once  magnesium sulfate Infusion 2 Gm/Hr (50 mL/Hr) IV Continuous <Continuous>  oxytocin Infusion 333.333 milliUNIT(s)/Min (1000 mL/Hr) IV Continuous <Continuous>    MEDICATIONS  (PRN):  metoclopramide Injectable 10 milliGRAM(s) IV Push once PRN Nausea and/or Vomiting    Allergies    Allergy Status Unknown    Intolerances      Vital Signs Last 24 Hrs  T(C): 36.9 (2021 18:23), Max: 36.9 (2021 18:23)  T(F): 98.4 (2021 18:23), Max: 98.4 (2021 18:23)  HR: 79 (2021 18:53) (79 - 87)  BP: 141/82 (2021 18:53) (141/82 - 149/67)  BP(mean): 104 (2021 18:53) (97 - 105)  RR: 19 (2021 18:53) (18 - 24)  SpO2: 97% (2021 18:53) (97% - 98%)    Neurologic:  -Mental status: Unresponsive to verbal or noxious stimuli  -Cranial nerves:   II: Eyes open blink to threat absent   III, IV, VI: Vertical and horizontal nystagmus Pupils equally round and reactive to light  V:  Facial sensation V1-V3 equal and intact   VII: Face is symmetric with normal eye closure and smile  VIII: Hearing is bilaterally intact to finger rub  Motor: Increased tone through out.   Sensation: No response to verbal or noxious stimuli   Coordination: No dysmetria on finger-to-nose and heel-to-shin bilaterally  Reflexes: Downgoing toes bilaterally       NIHSS:26    Fingerstick Blood Glucose: CAPILLARY BLOOD GLUCOSE      POCT Blood Glucose.: 88 mg/dL (2021 17:27)    LABS:                        12.8   12.82 )-----------( 212      ( 2021 17:44 )             35.0         132<L>  |  102  |  See Note  ----------------------------<  See Note  4.3   |  20<L>  |  0.80    Ca    See Note      2021 17:45    TPro  5.1<L>  /  Alb  See Note  /  TBili  See Note  /  DBili  x   /  AST  See Note  /  ALT  See Note  /  AlkPhos  129<H>      PT/INR - ( 2021 17:44 )   PT: 10.9 sec;   INR: 0.90          PTT - ( 2021 17:44 )  PTT:>200.0 sec  CARDIAC MARKERS ( 2021 17:43 )  x     / <0.01 ng/mL / 120 U/L / x     / 1.6 ng/mL

## 2021-02-26 NOTE — LACTATION INITIAL EVALUATION - NS LACT CON REASON FOR REQ
Met with mother and FOB at about 5 hours s/p c/s for breech presentation @ 39.1 wks. Mother currently in the SICU for r/o stroke due. Per mother and FOB it was likely a reaction to a medication but mother is now able to speak clearly and stated that her vision is almost back to normal. Offered a lactation consult at this time or to come back at a later time and mother stated she preferred to be seen at this time. Set mother up on a double electric breast pump ([employer] Barcode Label # [id number]), educated on the use of the initiate program, care of pump kit parts, breast massage prior to pumping and hand expression after pumping. Assisted with pumping and hand expressing, collected 0.9mL of colostrum. Mother tired at this time and enc to call when she is ready for LC to return to complete breastfeeding education. Mother denied significant medical history. Infant currently being cared for in the [facility] and being supplemented with formula. Mother and FOB verbalized understanding of all information provided. Lactation will follow up when mother is ready./pump request/primaparous mom

## 2021-02-27 LAB
ALBUMIN SERPL ELPH-MCNC: 3 G/DL — LOW (ref 3.3–5)
ALP SERPL-CCNC: 136 U/L — HIGH (ref 40–120)
ALT FLD-CCNC: 12 U/L — SIGNIFICANT CHANGE UP (ref 10–45)
ANION GAP SERPL CALC-SCNC: 8 MMOL/L — SIGNIFICANT CHANGE UP (ref 5–17)
AST SERPL-CCNC: 19 U/L — SIGNIFICANT CHANGE UP (ref 10–40)
BILIRUB SERPL-MCNC: <0.2 MG/DL — SIGNIFICANT CHANGE UP (ref 0.2–1.2)
BUN SERPL-MCNC: 7 MG/DL — SIGNIFICANT CHANGE UP (ref 7–23)
CALCIUM SERPL-MCNC: 8.9 MG/DL — SIGNIFICANT CHANGE UP (ref 8.4–10.5)
CHLORIDE SERPL-SCNC: 106 MMOL/L — SIGNIFICANT CHANGE UP (ref 96–108)
CO2 SERPL-SCNC: 24 MMOL/L — SIGNIFICANT CHANGE UP (ref 22–31)
CREAT SERPL-MCNC: 0.85 MG/DL — SIGNIFICANT CHANGE UP (ref 0.5–1.3)
GLUCOSE SERPL-MCNC: 88 MG/DL — SIGNIFICANT CHANGE UP (ref 70–99)
HCT VFR BLD CALC: 32.8 % — LOW (ref 34.5–45)
HGB BLD-MCNC: 11 G/DL — LOW (ref 11.5–15.5)
MAGNESIUM SERPL-MCNC: 1.8 MG/DL — SIGNIFICANT CHANGE UP (ref 1.6–2.6)
MCHC RBC-ENTMCNC: 32.4 PG — SIGNIFICANT CHANGE UP (ref 27–34)
MCHC RBC-ENTMCNC: 33.5 GM/DL — SIGNIFICANT CHANGE UP (ref 32–36)
MCV RBC AUTO: 96.8 FL — SIGNIFICANT CHANGE UP (ref 80–100)
NRBC # BLD: 0 /100 WBCS — SIGNIFICANT CHANGE UP (ref 0–0)
PHOSPHATE SERPL-MCNC: 3.8 MG/DL — SIGNIFICANT CHANGE UP (ref 2.5–4.5)
PLATELET # BLD AUTO: 171 K/UL — SIGNIFICANT CHANGE UP (ref 150–400)
POTASSIUM SERPL-MCNC: 4.1 MMOL/L — SIGNIFICANT CHANGE UP (ref 3.5–5.3)
POTASSIUM SERPL-SCNC: 4.1 MMOL/L — SIGNIFICANT CHANGE UP (ref 3.5–5.3)
PROT SERPL-MCNC: 5.4 G/DL — LOW (ref 6–8.3)
RBC # BLD: 3.39 M/UL — LOW (ref 3.8–5.2)
RBC # FLD: 11.1 % — SIGNIFICANT CHANGE UP (ref 10.3–14.5)
SODIUM SERPL-SCNC: 138 MMOL/L — SIGNIFICANT CHANGE UP (ref 135–145)
WBC # BLD: 16.18 K/UL — HIGH (ref 3.8–10.5)
WBC # FLD AUTO: 16.18 K/UL — HIGH (ref 3.8–10.5)

## 2021-02-27 RX ORDER — IBUPROFEN 200 MG
600 TABLET ORAL EVERY 6 HOURS
Refills: 0 | Status: DISCONTINUED | OUTPATIENT
Start: 2021-02-28 | End: 2021-03-02

## 2021-02-27 RX ORDER — HEPARIN SODIUM 5000 [USP'U]/ML
5000 INJECTION INTRAVENOUS; SUBCUTANEOUS EVERY 12 HOURS
Refills: 0 | Status: DISCONTINUED | OUTPATIENT
Start: 2021-02-27 | End: 2021-03-02

## 2021-02-27 RX ORDER — ACETAMINOPHEN 500 MG
975 TABLET ORAL
Refills: 0 | Status: DISCONTINUED | OUTPATIENT
Start: 2021-02-27 | End: 2021-03-02

## 2021-02-27 RX ORDER — SIMETHICONE 80 MG/1
80 TABLET, CHEWABLE ORAL EVERY 4 HOURS
Refills: 0 | Status: DISCONTINUED | OUTPATIENT
Start: 2021-02-27 | End: 2021-03-02

## 2021-02-27 RX ORDER — MAGNESIUM SULFATE 500 MG/ML
1 VIAL (ML) INJECTION ONCE
Refills: 0 | Status: COMPLETED | OUTPATIENT
Start: 2021-02-27 | End: 2021-02-27

## 2021-02-27 RX ORDER — TETANUS TOXOID, REDUCED DIPHTHERIA TOXOID AND ACELLULAR PERTUSSIS VACCINE, ADSORBED 5; 2.5; 8; 8; 2.5 [IU]/.5ML; [IU]/.5ML; UG/.5ML; UG/.5ML; UG/.5ML
0.5 SUSPENSION INTRAMUSCULAR ONCE
Refills: 0 | Status: DISCONTINUED | OUTPATIENT
Start: 2021-02-27 | End: 2021-03-02

## 2021-02-27 RX ORDER — SODIUM CHLORIDE 9 MG/ML
1000 INJECTION, SOLUTION INTRAVENOUS
Refills: 0 | Status: DISCONTINUED | OUTPATIENT
Start: 2021-02-27 | End: 2021-02-28

## 2021-02-27 RX ORDER — IBUPROFEN 200 MG
600 TABLET ORAL EVERY 8 HOURS
Refills: 0 | Status: DISCONTINUED | OUTPATIENT
Start: 2021-02-27 | End: 2021-02-27

## 2021-02-27 RX ORDER — MAGNESIUM HYDROXIDE 400 MG/1
30 TABLET, CHEWABLE ORAL
Refills: 0 | Status: DISCONTINUED | OUTPATIENT
Start: 2021-02-27 | End: 2021-03-02

## 2021-02-27 RX ORDER — OXYCODONE HYDROCHLORIDE 5 MG/1
5 TABLET ORAL ONCE
Refills: 0 | Status: DISCONTINUED | OUTPATIENT
Start: 2021-02-27 | End: 2021-03-02

## 2021-02-27 RX ORDER — IBUPROFEN 200 MG
600 TABLET ORAL EVERY 6 HOURS
Refills: 0 | Status: DISCONTINUED | OUTPATIENT
Start: 2021-02-27 | End: 2021-03-02

## 2021-02-27 RX ORDER — OXYCODONE HYDROCHLORIDE 5 MG/1
5 TABLET ORAL
Refills: 0 | Status: DISCONTINUED | OUTPATIENT
Start: 2021-02-27 | End: 2021-03-02

## 2021-02-27 RX ORDER — LANOLIN
1 OINTMENT (GRAM) TOPICAL EVERY 6 HOURS
Refills: 0 | Status: DISCONTINUED | OUTPATIENT
Start: 2021-02-27 | End: 2021-03-02

## 2021-02-27 RX ORDER — LEVOTHYROXINE SODIUM 125 MCG
88 TABLET ORAL DAILY
Refills: 0 | Status: DISCONTINUED | OUTPATIENT
Start: 2021-02-27 | End: 2021-03-02

## 2021-02-27 RX ORDER — OXYTOCIN 10 UNIT/ML
333.33 VIAL (ML) INJECTION
Qty: 20 | Refills: 0 | Status: DISCONTINUED | OUTPATIENT
Start: 2021-02-27 | End: 2021-03-02

## 2021-02-27 RX ORDER — DIPHENHYDRAMINE HCL 50 MG
25 CAPSULE ORAL EVERY 6 HOURS
Refills: 0 | Status: DISCONTINUED | OUTPATIENT
Start: 2021-02-27 | End: 2021-03-02

## 2021-02-27 RX ADMIN — Medication 88 MICROGRAM(S): at 12:12

## 2021-02-27 RX ADMIN — Medication 600 MILLIGRAM(S): at 18:54

## 2021-02-27 RX ADMIN — Medication 600 MILLIGRAM(S): at 10:36

## 2021-02-27 RX ADMIN — Medication 600 MILLIGRAM(S): at 23:50

## 2021-02-27 RX ADMIN — Medication 100 GRAM(S): at 09:19

## 2021-02-27 NOTE — PROGRESS NOTE ADULT - ASSESSMENT
30 no PMH, admitted for  due to breech presentation under spinal anesthesia and TAP block, became somnolent while in PACU with slurred speech, stroke code called, CT scan with no significant findings. transferred to SICU for close monitoring. Clinically stable. Plan to step down.      NEURO: no sedation  CV: stable, maintain BP< 160/110  PULM: room air  GI/FEN: NPO with ice chips LR @125 after pitocin drip finishes  GYN: pitocin gtts  : masters  ENDO: ISS, levothyroxine 88  ID: none; s/p Ancef 2g preop  PPX: SCDs   LINES: PIVs,   WOUNDS/DRAINS:

## 2021-02-27 NOTE — PROVIDER CONTACT NOTE (OTHER) - SITUATION
Pt did not bleed much on sanitary pad. v1 sanitary pad used throughout the whole shift.
While transferring and getting settled in PACU, pt was noted to have a change in mental status, stating she felt "weird"

## 2021-02-27 NOTE — PROGRESS NOTE ADULT - NUTRITIONAL ASSESSMENT
30y Female POD# 1 s/p C/S, c/b unresponsiveness immediately post-op, post-ictal state vs drug rxn vs TIA, stable  1. unresponsiveness: no intracranial abnormalities noted in head imaging. conversing appropriately this AM  1. Neuro/Pain:  recommend toradol atc, tylenol atc, oxy prn  2  CV:  recommend maintaining BP <160/110, AM CBC pending  3. Pulm: recommend encourageing ISS & Ambulation  4. GI:  recommend advance as siddhartha  5. : Miguel in place, recommend removal today with TOV to follow  6. DVT ppx: recommend SCDs, SQH 12 hours post-spinal anesthesia  7. Care per SICU

## 2021-02-27 NOTE — PROGRESS NOTE ADULT - ASSESSMENT
Patient evaluated at bedside.   She reports pain is well controlled. Miguel in place. No Flatus yet.  Not OOB yet.  She denies HA, dizziness, CP, palpitations, SOB, n/v, or heavy vaginal bleeding.    Physical Exam:  Vital Signs Last 24 Hrs  T(C): 36.9 (26 Feb 2021 18:23), Max: 36.9 (26 Feb 2021 18:23)  T(F): 98.4 (26 Feb 2021 18:23), Max: 98.4 (26 Feb 2021 18:23)  HR: 73 (27 Feb 2021 07:00) (65 - 90)  BP: 125/80 (27 Feb 2021 07:00) (114/58 - 153/74)  BP(mean): 97 (27 Feb 2021 07:00) (79 - 105)  RR: 20 (27 Feb 2021 07:00) (13 - 25)  SpO2: 97% (27 Feb 2021 07:00) (94% - 100%)    Gen: NAD  Abd: + BS, soft, nontender, nondistended, no rebound or guarding  Incision clean, dry and intact  uterus firm at midline  : lochia WNL  Extremities: no swelling or calf tenderness                          11.0   16.18 )-----------( 171      ( 27 Feb 2021 06:12 )             32.8     02-27    138  |  106  |  7   ----------------------------<  88  4.1   |  24  |  0.85    Ca    8.9      27 Feb 2021 06:12  Phos  3.8     02-27  Mg     1.8     02-27    TPro  5.4<L>  /  Alb  3.0<L>  /  TBili  <0.2  /  DBili  x   /  AST  19  /  ALT  12  /  AlkPhos  136<H>  02-27    Magnesium, Serum: 1.8 mg/dL (02-27 @ 06:12)    PT/INR - ( 26 Feb 2021 17:44 )   PT: 10.9 sec;   INR: 0.90          PTT - ( 26 Feb 2021 17:44 )  PTT:>200.0 sec

## 2021-02-28 LAB
ALBUMIN SERPL ELPH-MCNC: 3.3 G/DL — SIGNIFICANT CHANGE UP (ref 3.3–5)
ALP SERPL-CCNC: 131 U/L — HIGH (ref 40–120)
ALT FLD-CCNC: 17 U/L — SIGNIFICANT CHANGE UP (ref 10–45)
ANION GAP SERPL CALC-SCNC: 8 MMOL/L — SIGNIFICANT CHANGE UP (ref 5–17)
AST SERPL-CCNC: 25 U/L — SIGNIFICANT CHANGE UP (ref 10–40)
BILIRUB SERPL-MCNC: 0.2 MG/DL — SIGNIFICANT CHANGE UP (ref 0.2–1.2)
BUN SERPL-MCNC: 11 MG/DL — SIGNIFICANT CHANGE UP (ref 7–23)
CALCIUM SERPL-MCNC: 8.8 MG/DL — SIGNIFICANT CHANGE UP (ref 8.4–10.5)
CHLORIDE SERPL-SCNC: 106 MMOL/L — SIGNIFICANT CHANGE UP (ref 96–108)
CO2 SERPL-SCNC: 25 MMOL/L — SIGNIFICANT CHANGE UP (ref 22–31)
CREAT SERPL-MCNC: 0.98 MG/DL — SIGNIFICANT CHANGE UP (ref 0.5–1.3)
GLUCOSE SERPL-MCNC: 80 MG/DL — SIGNIFICANT CHANGE UP (ref 70–99)
HCT VFR BLD CALC: 35.9 % — SIGNIFICANT CHANGE UP (ref 34.5–45)
HGB BLD-MCNC: 11.7 G/DL — SIGNIFICANT CHANGE UP (ref 11.5–15.5)
MAGNESIUM SERPL-MCNC: 2.1 MG/DL — SIGNIFICANT CHANGE UP (ref 1.6–2.6)
MCHC RBC-ENTMCNC: 32.6 GM/DL — SIGNIFICANT CHANGE UP (ref 32–36)
MCHC RBC-ENTMCNC: 32.6 PG — SIGNIFICANT CHANGE UP (ref 27–34)
MCV RBC AUTO: 100 FL — SIGNIFICANT CHANGE UP (ref 80–100)
NRBC # BLD: 0 /100 WBCS — SIGNIFICANT CHANGE UP (ref 0–0)
PHOSPHATE SERPL-MCNC: 4.2 MG/DL — SIGNIFICANT CHANGE UP (ref 2.5–4.5)
PLATELET # BLD AUTO: 205 K/UL — SIGNIFICANT CHANGE UP (ref 150–400)
POTASSIUM SERPL-MCNC: 4 MMOL/L — SIGNIFICANT CHANGE UP (ref 3.5–5.3)
POTASSIUM SERPL-SCNC: 4 MMOL/L — SIGNIFICANT CHANGE UP (ref 3.5–5.3)
PROT SERPL-MCNC: 6.1 G/DL — SIGNIFICANT CHANGE UP (ref 6–8.3)
RBC # BLD: 3.59 M/UL — LOW (ref 3.8–5.2)
RBC # FLD: 11.7 % — SIGNIFICANT CHANGE UP (ref 10.3–14.5)
SARS-COV-2 IGG SERPL QL IA: NEGATIVE — SIGNIFICANT CHANGE UP
SARS-COV-2 IGM SERPL IA-ACNC: <0.1 INDEX — SIGNIFICANT CHANGE UP
SODIUM SERPL-SCNC: 139 MMOL/L — SIGNIFICANT CHANGE UP (ref 135–145)
WBC # BLD: 14.09 K/UL — HIGH (ref 3.8–10.5)
WBC # FLD AUTO: 14.09 K/UL — HIGH (ref 3.8–10.5)

## 2021-02-28 PROCEDURE — 70544 MR ANGIOGRAPHY HEAD W/O DYE: CPT | Mod: 26

## 2021-02-28 RX ADMIN — HEPARIN SODIUM 5000 UNIT(S): 5000 INJECTION INTRAVENOUS; SUBCUTANEOUS at 06:15

## 2021-02-28 RX ADMIN — Medication 600 MILLIGRAM(S): at 05:47

## 2021-02-28 RX ADMIN — Medication 600 MILLIGRAM(S): at 18:15

## 2021-02-28 RX ADMIN — HEPARIN SODIUM 5000 UNIT(S): 5000 INJECTION INTRAVENOUS; SUBCUTANEOUS at 18:14

## 2021-02-28 RX ADMIN — Medication 600 MILLIGRAM(S): at 13:12

## 2021-02-28 NOTE — PROGRESS NOTE ADULT - ASSESSMENT
30y Female POD# 2 s/p C/S, c/b unresponsiveness immediately post-op, post-ictal state vs drug rxn vs TIA, stable  1. unresponsiveness: no intracranial abnormalities noted in head imaging. conversing appropriately this AM  1. Neuro/Pain:  recommend toradol atc, tylenol atc, oxy prn. F/u MR venogram  2  CV:  recommend maintaining BP <160/110, VSS  3. Pulm: recommend encouraging ISS & Ambulation  4. GI: regular diet  5. : Voiding spontaneously   6. DVT ppx: subq heparin 5000 BID

## 2021-02-28 NOTE — PROGRESS NOTE ADULT - ASSESSMENT
This is a 29 y/o female s/p C section for breach pregnancy 2/26 complicated by episode of unresponsiveness immediately post spinal block. Stroke code called. All imaging negative. Patient back to baseline mental status. Pending results of MR venogram.    This is a 29 y/o female s/p C section for breach pregnancy 2/26 complicated by episode of unresponsiveness immediately post spinal block. Stroke code called. All imaging negative. Patient now back to baseline mental status. MR venogram shows no thrombus. Episode likely secondary to reaction to medication during c section.       No need for further stroke evaluation. Stroke neurology signing off. Please contact us at  with any questions or concerns.

## 2021-03-01 ENCOUNTER — TRANSCRIPTION ENCOUNTER (OUTPATIENT)
Age: 31
End: 2021-03-01

## 2021-03-01 LAB
ANION GAP SERPL CALC-SCNC: 8 MMOL/L — SIGNIFICANT CHANGE UP (ref 5–17)
BUN SERPL-MCNC: 13 MG/DL — SIGNIFICANT CHANGE UP (ref 7–23)
CALCIUM SERPL-MCNC: 8.4 MG/DL — SIGNIFICANT CHANGE UP (ref 8.4–10.5)
CHLORIDE SERPL-SCNC: 106 MMOL/L — SIGNIFICANT CHANGE UP (ref 96–108)
CO2 SERPL-SCNC: 24 MMOL/L — SIGNIFICANT CHANGE UP (ref 22–31)
CREAT SERPL-MCNC: 0.92 MG/DL — SIGNIFICANT CHANGE UP (ref 0.5–1.3)
GLUCOSE SERPL-MCNC: 67 MG/DL — LOW (ref 70–99)
HCT VFR BLD CALC: 29.8 % — LOW (ref 34.5–45)
HGB BLD-MCNC: 9.8 G/DL — LOW (ref 11.5–15.5)
MAGNESIUM SERPL-MCNC: 1.8 MG/DL — SIGNIFICANT CHANGE UP (ref 1.6–2.6)
MCHC RBC-ENTMCNC: 32.3 PG — SIGNIFICANT CHANGE UP (ref 27–34)
MCHC RBC-ENTMCNC: 32.9 GM/DL — SIGNIFICANT CHANGE UP (ref 32–36)
MCV RBC AUTO: 98.3 FL — SIGNIFICANT CHANGE UP (ref 80–100)
NRBC # BLD: 0 /100 WBCS — SIGNIFICANT CHANGE UP (ref 0–0)
PHOSPHATE SERPL-MCNC: 3.5 MG/DL — SIGNIFICANT CHANGE UP (ref 2.5–4.5)
PLATELET # BLD AUTO: 180 K/UL — SIGNIFICANT CHANGE UP (ref 150–400)
POTASSIUM SERPL-MCNC: 4 MMOL/L — SIGNIFICANT CHANGE UP (ref 3.5–5.3)
POTASSIUM SERPL-SCNC: 4 MMOL/L — SIGNIFICANT CHANGE UP (ref 3.5–5.3)
RBC # BLD: 3.03 M/UL — LOW (ref 3.8–5.2)
RBC # FLD: 11.3 % — SIGNIFICANT CHANGE UP (ref 10.3–14.5)
SODIUM SERPL-SCNC: 138 MMOL/L — SIGNIFICANT CHANGE UP (ref 135–145)
WBC # BLD: 7.55 K/UL — SIGNIFICANT CHANGE UP (ref 3.8–10.5)
WBC # FLD AUTO: 7.55 K/UL — SIGNIFICANT CHANGE UP (ref 3.8–10.5)

## 2021-03-01 RX ORDER — LEVOTHYROXINE SODIUM 125 MCG
1 TABLET ORAL
Qty: 0 | Refills: 0 | DISCHARGE

## 2021-03-01 RX ADMIN — Medication 88 MICROGRAM(S): at 06:38

## 2021-03-01 RX ADMIN — Medication 600 MILLIGRAM(S): at 06:38

## 2021-03-01 RX ADMIN — Medication 600 MILLIGRAM(S): at 18:06

## 2021-03-01 RX ADMIN — HEPARIN SODIUM 5000 UNIT(S): 5000 INJECTION INTRAVENOUS; SUBCUTANEOUS at 18:05

## 2021-03-01 RX ADMIN — Medication 1 APPLICATION(S): at 18:05

## 2021-03-01 RX ADMIN — Medication 600 MILLIGRAM(S): at 00:00

## 2021-03-01 RX ADMIN — Medication 600 MILLIGRAM(S): at 11:46

## 2021-03-01 RX ADMIN — HEPARIN SODIUM 5000 UNIT(S): 5000 INJECTION INTRAVENOUS; SUBCUTANEOUS at 06:38

## 2021-03-01 NOTE — PROGRESS NOTE ADULT - ASSESSMENT
30y Female POD# 3 s/p C/S, c/b unresponsiveness immediately post-op, post-ictal state vs drug rxn vs TIA, stable  1. unresponsiveness: no intracranial abnormalities noted in head imaging. conversing appropriately this AM  1. Neuro/Pain:  recommend toradol atc, tylenol atc, oxy prn. F/u MR venogram final read  2  CV:  recommend maintaining BP <160/110, VSS  3. Pulm: recommend encouraging ISS & Ambulation  4. GI: regular diet  5. : Voiding spontaneously   6. DVT ppx: subq heparin 5000 BID

## 2021-03-01 NOTE — DISCHARGE NOTE OB - CARE PLAN
Principal Discharge DX:	Postpartum state  Goal:	Happy and healthy mother and baby  Assessment and plan of treatment:	Please follow-up with your OB doctor within 2 weeks for an incision check. You can resume a regular diet at home and you should continue your prenatal vitamins as directed. Please place nothing in the vagina for 6 weeks (no tampons, no sex, no douching, no tub baths, no swimming pools, etc). If you have severe headaches and/or vision changes, heavy bleeding, chest pain, please call your provider or go to the nearest ED. Please call your OB with any signs of symptoms of infection including fever > 100.4 degrees, severe pain, malodorous vaginal discharge or heavy bleeding requiring more than 1-2 pads/hour. You can take Motrin 600mg orally every 6 hours for pain as needed.  Secondary Diagnosis:	Medication reaction

## 2021-03-01 NOTE — DISCHARGE NOTE OB - PLAN OF CARE
Please follow-up with your OB doctor within 2 weeks for an incision check. You can resume a regular diet at home and you should continue your prenatal vitamins as directed. Please place nothing in the vagina for 6 weeks (no tampons, no sex, no douching, no tub baths, no swimming pools, etc). If you have severe headaches and/or vision changes, heavy bleeding, chest pain, please call your provider or go to the nearest ED. Please call your OB with any signs of symptoms of infection including fever > 100.4 degrees, severe pain, malodorous vaginal discharge or heavy bleeding requiring more than 1-2 pads/hour. You can take Motrin 600mg orally every 6 hours for pain as needed. Happy and healthy mother and baby

## 2021-03-01 NOTE — DISCHARGE NOTE OB - PATIENT PORTAL LINK FT
You can access the FollowMyHealth Patient Portal offered by [facility] by registering at the following website: [email or web address]. By joining [employer]’s FollowMyHealth portal, you will also be able to view your health information using other applications (apps) compatible with our system.

## 2021-03-01 NOTE — DISCHARGE NOTE OB - ADMISSION DATE +STARTOFVISITDATE
Aleisha Talavera was admitted to 2208 from ED via cart accompanied by Other ED tech.   Reason for hospitalization is Diarrhea, weakness, dehydration   Upon arrival, patient is stable. Patient has history significant for ESRD, HTN, Afib, DM and renal CA.  Patient oriented to bed, call light,  and room.  Patient provided with the following educational materials upon admission:safety, advanced directives, infection control and pain.   Level of understanding patient verbalized understanding.   Admission orders received at this time.   Dr. Chand notified of patient arrival.   See Epic documentation for patient individualized nursing care plan.  LBM 12/21/19  PT on RA   Statement Selected

## 2021-03-01 NOTE — DISCHARGE NOTE OB - HOSPITAL COURSE
Pt had an uncomplicated primary c/s at 39+1 for breech presentation, EBL 800cc, see operative report for details. Pt received a TAP block. Pt had an acute neurological reaction - drug reaction vs post-ictal state vs TIA. Stroke code called, STAT CT head imaging without pathology Pt spontaneously recovered, was completely asymptomatic upon d/c. Per neurology, MRV ordered, prelim read WNL. When pt was d/c, VSS, pain well controlled, she was neurologically intact. She will f/u in the office within 2 weeks for an incision check.

## 2021-03-01 NOTE — DISCHARGE NOTE OB - NS AS DC PROVIDER CONTACT Y/N MULTI
HOME MONITORING REPORT    INR today:   Results for orders placed or performed in visit on 12/29/20   Protime-INR   Result Value Ref Range    INR 2.40        INR Goal: 2.0-3.0    Dosing Plan  As of 12/29/2020    TTR:  77.1 % (3.4 y)   Full warfarin instructions:  9 mg every Thu, Sat; 6 mg all other days              PLAN: Advised patient/caregiver to continue current dose and recheck in one week. Patient/Caregiver voiced understanding    I have reviewed nursing plan for Coumadin management and agree with plan. Yes

## 2021-03-01 NOTE — DISCHARGE NOTE OB - CARE PROVIDER_API CALL
Roxanna Gao)  Obstetrics and Gynecology  [address] [place] [id number]  Phone: [phone]  Fax: [phone]  Follow Up Time:

## 2021-03-02 VITALS
TEMPERATURE: 98 F | SYSTOLIC BLOOD PRESSURE: 106 MMHG | OXYGEN SATURATION: 99 % | RESPIRATION RATE: 18 BRPM | HEART RATE: 66 BPM | DIASTOLIC BLOOD PRESSURE: 70 MMHG

## 2021-03-02 LAB — SURGICAL PATHOLOGY STUDY: SIGNIFICANT CHANGE UP

## 2021-03-02 PROCEDURE — 82553 CREATINE MB FRACTION: CPT

## 2021-03-02 PROCEDURE — 86769 SARS-COV-2 COVID-19 ANTIBODY: CPT

## 2021-03-02 PROCEDURE — 82962 GLUCOSE BLOOD TEST: CPT

## 2021-03-02 PROCEDURE — 70496 CT ANGIOGRAPHY HEAD: CPT

## 2021-03-02 PROCEDURE — 86901 BLOOD TYPING SEROLOGIC RH(D): CPT

## 2021-03-02 PROCEDURE — 83615 LACTATE (LD) (LDH) ENZYME: CPT

## 2021-03-02 PROCEDURE — 83605 ASSAY OF LACTIC ACID: CPT

## 2021-03-02 PROCEDURE — 70498 CT ANGIOGRAPHY NECK: CPT

## 2021-03-02 PROCEDURE — 85025 COMPLETE CBC W/AUTO DIFF WBC: CPT

## 2021-03-02 PROCEDURE — 86850 RBC ANTIBODY SCREEN: CPT

## 2021-03-02 PROCEDURE — 84100 ASSAY OF PHOSPHORUS: CPT

## 2021-03-02 PROCEDURE — 0042T: CPT

## 2021-03-02 PROCEDURE — 85027 COMPLETE CBC AUTOMATED: CPT

## 2021-03-02 PROCEDURE — 88307 TISSUE EXAM BY PATHOLOGIST: CPT

## 2021-03-02 PROCEDURE — 84550 ASSAY OF BLOOD/URIC ACID: CPT

## 2021-03-02 PROCEDURE — 85610 PROTHROMBIN TIME: CPT

## 2021-03-02 PROCEDURE — 82550 ASSAY OF CK (CPK): CPT

## 2021-03-02 PROCEDURE — 84484 ASSAY OF TROPONIN QUANT: CPT

## 2021-03-02 PROCEDURE — 85730 THROMBOPLASTIN TIME PARTIAL: CPT

## 2021-03-02 PROCEDURE — 70544 MR ANGIOGRAPHY HEAD W/O DYE: CPT

## 2021-03-02 PROCEDURE — 86900 BLOOD TYPING SEROLOGIC ABO: CPT

## 2021-03-02 PROCEDURE — 82803 BLOOD GASES ANY COMBINATION: CPT

## 2021-03-02 PROCEDURE — 36415 COLL VENOUS BLD VENIPUNCTURE: CPT

## 2021-03-02 PROCEDURE — 59050 FETAL MONITOR W/REPORT: CPT

## 2021-03-02 PROCEDURE — 80053 COMPREHEN METABOLIC PANEL: CPT

## 2021-03-02 PROCEDURE — 80048 BASIC METABOLIC PNL TOTAL CA: CPT

## 2021-03-02 PROCEDURE — 85384 FIBRINOGEN ACTIVITY: CPT

## 2021-03-02 PROCEDURE — 83735 ASSAY OF MAGNESIUM: CPT

## 2021-03-02 PROCEDURE — 70450 CT HEAD/BRAIN W/O DYE: CPT

## 2021-03-02 RX ADMIN — HEPARIN SODIUM 5000 UNIT(S): 5000 INJECTION INTRAVENOUS; SUBCUTANEOUS at 06:29

## 2021-03-02 RX ADMIN — Medication 600 MILLIGRAM(S): at 00:05

## 2021-03-02 RX ADMIN — Medication 88 MICROGRAM(S): at 06:28

## 2021-03-02 RX ADMIN — Medication 600 MILLIGRAM(S): at 06:28

## 2021-03-02 RX ADMIN — Medication 600 MILLIGRAM(S): at 11:26

## 2021-03-02 NOTE — PROGRESS NOTE ADULT - SUBJECTIVE AND OBJECTIVE BOX
Neurology Stroke Progress Note    INTERVAL HPI/OVERNIGHT EVENTS:  Patient seen and examined. Patient back to baseline mental status. Feels well and is anxious to be discharged to home with her .     MEDICATIONS  (STANDING):  acetaminophen   Tablet .. 975 milliGRAM(s) Oral <User Schedule>  BUpivacaine liposome 1.3% Injectable (no eMAR) 20 milliLiter(s) Local Injection once  diphtheria/tetanus/pertussis (acellular) Vaccine (ADAcel) 0.5 milliLiter(s) IntraMuscular once  heparin   Injectable 5000 Unit(s) SubCutaneous every 12 hours  ibuprofen  Tablet. 600 milliGRAM(s) Oral every 6 hours  ibuprofen  Tablet. 600 milliGRAM(s) Oral every 6 hours  influenza   Vaccine 0.5 milliLiter(s) IntraMuscular once  levothyroxine 88 MICROGram(s) Oral daily  oxytocin Infusion 333.333 milliUNIT(s)/Min (1000 mL/Hr) IV Continuous <Continuous>  prenatal multivitamin 1 Tablet(s) Oral daily    MEDICATIONS  (PRN):  diphenhydrAMINE 25 milliGRAM(s) Oral every 6 hours PRN Pruritus  lanolin Ointment 1 Application(s) Topical every 6 hours PRN Sore Nipples  magnesium hydroxide Suspension 30 milliLiter(s) Oral two times a day PRN Constipation  oxyCODONE    IR 5 milliGRAM(s) Oral every 3 hours PRN Moderate to Severe Pain (4-10)  oxyCODONE    IR 5 milliGRAM(s) Oral once PRN Moderate to Severe Pain (4-10)  simethicone 80 milliGRAM(s) Chew every 4 hours PRN Gas      Allergies    Allergy Status Unknown    Intolerances        Vital Signs Last 24 Hrs  T(C): 36.8 (2021 14:00), Max: 36.8 (2021 02:00)  T(F): 98.2 (2021 14:00), Max: 98.3 (2021 02:00)  HR: 77 (2021 14:00) (70 - 81)  BP: 127/74 (2021 14:00) (111/72 - 127/74)  BP(mean): --  RR: 16 (2021 14:00) (16 - 18)  SpO2: 98% (2021 14:00) (97% - 98%)    Physical exam:  General: No acute distress, awake and alert  Cardiovascular: Regular rate and rhythm, no murmurs, rubs, or gallops. No bruits  Pulmonary: Anterior breath sounds clear bilaterally, no crackles or wheezing. No use of accessory muscles  Extremities: Radial and DP pulses +2, no edema    Neurologic:  -Mental status: Awake, alert, oriented to person, place, and time. Speech is fluent with intact naming, repetition, and comprehension, no dysarthria. Recent and remote memory intact. Follows commands. Attention/concentration intact. Fund of knowledge appropriate.  -Cranial nerves:   II: Visual fields are full to confrontation.  III, IV, VI: Extraocular movements are intact without nystagmus. Pupils equally round and reactive to light  V:  Facial sensation V1-V3 equal and intact   VII: Face is symmetric with normal eye closure and smile  VIII: Hearing is bilaterally intact to finger rub  IX, X: Uvula is midline and soft palate rises symmetrically  XI: Head turning and shoulder shrug are intact.  XII: Tongue protrudes midline  Motor: Normal bulk and tone. No pronator drift. Strength bilateral upper extremity 5/5, bilateral lower extremities 5/5.  Rapid alternating movements intact and symmetric  Sensation: Intact to light touch bilaterally. No neglect or extinction on double simultaneous testing.  Coordination: No dysmetria on finger-to-nose and heel-to-shin bilaterally  Reflexes: Downgoing toes bilaterally   Gait: Narrow gait and steady    LABS:                        11.7   14.09 )-----------( 205      ( 2021 05:36 )             35.9         139  |  106  |  11  ----------------------------<  80  4.0   |  25  |  0.98    Ca    8.8      2021 05:36  Phos  4.2       Mg     2.1         TPro  6.1  /  Alb  3.3  /  TBili  0.2  /  DBili  x   /  AST  25  /  ALT  17  /  AlkPhos  131<H>      PT/INR - ( 2021 17:44 )   PT: 10.9 sec;   INR: 0.90          PTT - ( 2021 17:44 )  PTT:>200.0 sec      RADIOLOGY & ADDITIONAL TESTS:    
Patient evaluated at bedside in SICU. Patient reports that she feels well - like herself. She reports that her pain is increased from last night but overall well controlled. No nausea or vomiting. Able to move all extremities without difficulty. Vitals and labs reviewed. Patient stable for step down to high risk Maternal Child unit. Plan of care discussed with ICU team and primary OBGYN Dr. Gao. 
Patient evaluated at bedside.   She reports pain is well controlled with OPM.  She has been ambulating without assistance, voiding spontaneously, passing gas, tolerating regular diet and is breastfeeding.  She denies HA, dizziness, CP, palpitations, SOB, n/v, or heavy vaginal bleeding.    Physical Exam:  Vital Signs Last 24 Hrs  T(C): 36.7 (02 Mar 2021 02:08), Max: 36.7 (02 Mar 2021 02:08)  T(F): 98 (02 Mar 2021 02:08), Max: 98 (02 Mar 2021 02:08)  HR: 60 (02 Mar 2021 02:08) (60 - 71)  BP: 108/63 (02 Mar 2021 02:08) (108/63 - 118/75)  BP(mean): 84 (01 Mar 2021 10:00) (84 - 84)  RR: 17 (02 Mar 2021 02:08) (17 - 18)  SpO2: 98% (02 Mar 2021 02:08) (96% - 100%)    Gen: NAD  Abd: + BS, soft, nontender, nondistended, no rebound or guarding  Incision clean, dry and intact  uterus firm at midline  : lochia WNL  Extremities: no swelling or calf tenderness                          9.8    7.55  )-----------( 180      ( 01 Mar 2021 06:14 )             29.8     03-01    138  |  106  |  13  ----------------------------<  67<L>  4.0   |  24  |  0.92    Ca    8.4      01 Mar 2021 06:14  Phos  3.5     03-01  Mg     1.8     03-01          
INTERVAL HPI/OVERNIGHT EVENTS: Admitted to SICU o/n for closer monitoring following change in mental status. Clinically stable w/o acute events o/n. CTA performed w/o proximal arterial occlusion or high-grade stenosis      Examined at the bedside in the am. States has mild abdominal discomfort. Spotting o/n w/ pad changed. No nausea or emesis o/n.     PRESSORS: [ ] YES [ ] NO  WHICH:  DOSE:    ANTIBIOTICS:                  DATE STARTED:  ANTIBIOTICS:                  DATE STARTED:  ANTIBIOTICS:                  DATE STARTED:    CENTRAL LINE: [ ] YES [ ] NO  LOCATION:   DATE INSERTED:  REMOVE: [ ] YES [ ] NO  EXPLAIN:    SADLER: [ ] YES [ ] NO    DATE INSERTED:  REMOVE: [ ] YES [ ] NO  EXPLAIN:    A-LINE: [ ] YES [ ] NO  LOCATION:   DATE INSERTED:  REMOVE: [ ] YES [ ] NO  EXPLAIN:    ICU Vital Signs Last 24 Hrs  T(C): 36.7 (27 Feb 2021 09:00), Max: 36.9 (26 Feb 2021 18:23)  T(F): 98 (27 Feb 2021 09:00), Max: 98.4 (26 Feb 2021 18:23)  HR: 81 (27 Feb 2021 10:00) (65 - 90)  BP: 120/78 (27 Feb 2021 10:00) (112/61 - 153/74)  BP(mean): 95 (27 Feb 2021 10:00) (79 - 105)  ABP: --  ABP(mean): --  RR: 24 (27 Feb 2021 10:00) (13 - 25)  SpO2: 99% (27 Feb 2021 10:00) (94% - 100%)      ABG - ( 26 Feb 2021 17:42 )  pH, Arterial: 7.40  pH, Blood: x     /  pCO2: 36    /  pO2: 210   / HCO3: 22    / Base Excess: -2.6  /  SaO2: 99                    26 Feb 2021 07:01  -  27 Feb 2021 07:00  --------------------------------------------------------  IN:    Lactated Ringers: 2250 mL    Lactated Ringers Bolus: 1000 mL    Oxytocin: 750 mL  Total IN: 4000 mL    OUT:    Estimated Blood Loss (mL): 800 mL    Indwelling Catheter - Urethral (mL): 1925 mL    Magnesium Sulfate: 0 mL  Total OUT: 2725 mL    Total NET: 1275 mL      27 Feb 2021 07:01  -  27 Feb 2021 10:14  --------------------------------------------------------  IN:    Lactated Ringers: 125 mL  Total IN: 125 mL    OUT:    Indwelling Catheter - Urethral (mL): 500 mL  Total OUT: 500 mL    Total NET: -375 mL    PHYSICAL EXAM:  General: NAD, resting comfortably in bed  C/V: NSR, no murmurs, or gallops  Pulm: Nonlabored breathing, no respiratory distress on RA, CTAB  Abd: soft, gravid, mild discomfort to palpation, Pfannenstiel incision w/ steri strips   Extrem: WWP, no edema,  Neuro: A/O x 3, CNs II-XII grossly intact, no focal deficits, normal sensation  Pulses: palpable distal pulses   : pad dry    LABS:                        11.0   16.18 )-----------( 171      ( 27 Feb 2021 06:12 )             32.8     02-27    138  |  106  |  7   ----------------------------<  88  4.1   |  24  |  0.85    Ca    8.9      27 Feb 2021 06:12  Phos  3.8     02-27  Mg     1.8     02-27    TPro  5.4<L>  /  Alb  3.0<L>  /  TBili  <0.2  /  DBili  x   /  AST  19  /  ALT  12  /  AlkPhos  136<H>  02-27      RADIOLOGY & ADDITIONAL STUDIES:  < from: CT Angio Neck w/ IV Cont (02.26.21 @ 18:16) >  FINDINGS:    There is limited contrast filling of the arterial system on the CTA study. Despite this limitation, the internal carotid arteries at the skull base appear grossly patent and symmetric caliber, as they do at their intracranial supraclinoid segments. The anterior and middle cerebral arteries appear grossly patent and symmetric at their first and second order branches without point of occlusion or high-grade stenosis demonstrated. More peripheral vasculature is poorly defined. The intracranial vertebral arteries appear patent and symmetric caliber. 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 The P2 segments appear grossly patent. No large intracranial aneurysm is shown, though small aneurysm(s) may certainly be missed given suboptimal contrast bolus.    On CT perfusion, there is no reported deficit in cerebral blood flow or cerebral blood volume. There is widespread reported Tmax elevation totalling 164 cc. This is likely artifactual in the presence of patient motion as well as irregular AIF and VOF curves, rendering the study as nondiagnostic.      IMPRESSION:    Limited CTA examination of the brain. No proximal arterial occlusion or high-grade stenosis is demonstrated. There is poor visualization of peripheral branching (3rd and 4th order segments) due to suboptimal contrast bolus.    < end of copied text >  
Patient evaluated at bedside.   She reports pain is well controlled with OPM.  She has been ambulating without assistance, voiding spontaneously, passing gas, tolerating regular diet and is breastfeeding.  She denies HA, dizziness, CP, palpitations, SOB, n/v, or heavy vaginal bleeding.    Physical Exam:  Vital Signs Last 24 Hrs  T(C): 36.7 (28 Feb 2021 06:00), Max: 36.8 (28 Feb 2021 02:00)  T(F): 98.1 (28 Feb 2021 06:00), Max: 98.3 (28 Feb 2021 02:00)  HR: 70 (28 Feb 2021 06:00) (70 - 82)  BP: 111/72 (28 Feb 2021 06:00) (111/72 - 120/78)  BP(mean): 91 (27 Feb 2021 11:00) (81 - 95)  RR: 18 (28 Feb 2021 06:00) (12 - 24)  SpO2: 98% (28 Feb 2021 06:00) (96% - 99%)    Gen: NAD  Abd: + BS, soft, nontender, nondistended, no rebound or guarding  Incision clean, dry and intact  uterus firm at midline  : lochia WNL  Extremities: no swelling or calf tenderness                          11.7   14.09 )-----------( 205      ( 28 Feb 2021 05:36 )             35.9     02-28    139  |  106  |  11  ----------------------------<  80  4.0   |  25  |  0.98    Ca    8.8      28 Feb 2021 05:36  Phos  4.2     02-28  Mg     2.1     02-28    TPro  6.1  /  Alb  3.3  /  TBili  0.2  /  DBili  x   /  AST  25  /  ALT  17  /  AlkPhos  131<H>  02-28    Magnesium, Serum: 2.1 mg/dL (02-28 @ 05:36)    PT/INR - ( 26 Feb 2021 17:44 )   PT: 10.9 sec;   INR: 0.90          PTT - ( 26 Feb 2021 17:44 )  PTT:>200.0 sec  
Patient evaluated at bedside.   She reports pain is well controlled with OPM.  She has been ambulating without assistance, voiding spontaneously, passing gas, tolerating regular diet and is breastfeeding.  She denies HA, dizziness, CP, palpitations, SOB, n/v, or heavy vaginal bleeding.    Physical Exam:  Vital Signs Last 24 Hrs  T(C): 36.4 (01 Mar 2021 06:00), Max: 36.8 (28 Feb 2021 14:00)  T(F): 97.6 (01 Mar 2021 06:00), Max: 98.2 (28 Feb 2021 14:00)  HR: 59 (01 Mar 2021 06:00) (59 - 77)  BP: 110/69 (01 Mar 2021 06:00) (110/69 - 127/74)  BP(mean): --  RR: 18 (01 Mar 2021 06:00) (16 - 18)  SpO2: 97% (01 Mar 2021 06:00) (97% - 98%)    Gen: NAD  Abd: + BS, soft, nontender, nondistended, no rebound or guarding  Incision clean, dry and intact  uterus firm at midline  : lochia WNL  Extremities: no swelling or calf tenderness                          11.7   14.09 )-----------( 205      ( 28 Feb 2021 05:36 )             35.9     02-28    139  |  106  |  11  ----------------------------<  80  4.0   |  25  |  0.98    Ca    8.8      28 Feb 2021 05:36  Phos  4.2     02-28  Mg     2.1     02-28    TPro  6.1  /  Alb  3.3  /  TBili  0.2  /  DBili  x   /  AST  25  /  ALT  17  /  AlkPhos  131<H>  02-28        
Patient evaluated at bedside.   She reports pain is well controlled. Miguel in place. No Flatus yet.  Not OOB yet.  She denies HA, dizziness, CP, palpitations, SOB, n/v, or heavy vaginal bleeding.    Physical Exam:  Vital Signs Last 24 Hrs  T(C): 36.9 (26 Feb 2021 18:23), Max: 36.9 (26 Feb 2021 18:23)  T(F): 98.4 (26 Feb 2021 18:23), Max: 98.4 (26 Feb 2021 18:23)  HR: 73 (27 Feb 2021 07:00) (65 - 90)  BP: 125/80 (27 Feb 2021 07:00) (114/58 - 153/74)  BP(mean): 97 (27 Feb 2021 07:00) (79 - 105)  RR: 20 (27 Feb 2021 07:00) (13 - 25)  SpO2: 97% (27 Feb 2021 07:00) (94% - 100%)    Gen: NAD  Abd: + BS, soft, nontender, nondistended, no rebound or guarding  Incision clean, dry and intact, compression dressing removed  uterus firm at midline below the umbilicus  : lochia WNL  Extremities: no swelling or calf tenderness                          11.0   16.18 )-----------( 171      ( 27 Feb 2021 06:12 )             32.8     02-27    138  |  106  |  7   ----------------------------<  88  4.1   |  24  |  0.85    Ca    8.9      27 Feb 2021 06:12  Phos  3.8     02-27  Mg     1.8     02-27    TPro  5.4<L>  /  Alb  3.0<L>  /  TBili  <0.2  /  DBili  x   /  AST  19  /  ALT  12  /  AlkPhos  136<H>  02-27    Magnesium, Serum: 1.8 mg/dL (02-27 @ 06:12)    PT/INR - ( 26 Feb 2021 17:44 )   PT: 10.9 sec;   INR: 0.90          PTT - ( 26 Feb 2021 17:44 )  PTT:>200.0 sec

## 2021-03-02 NOTE — PROGRESS NOTE ADULT - ATTENDING COMMENTS
doing well  can step down to a regular room
doing well  for dc to home  rto 2 weeks for check up
patient  looking great  has some sorenesss at incision but has not had pain meds  wants motrin  labs noted  plan to transfer to 6 th floor today
pt doing well  continue to monitor

## 2021-03-02 NOTE — PROGRESS NOTE ADULT - ASSESSMENT
30y Female POD# 4 s/p C/S, c/b unresponsiveness immediately post-op, post-ictal state vs drug rxn vs TIA, stable  1. unresponsiveness: no intracranial abnormalities noted in head imaging. conversing appropriately this AM  1. Neuro/Pain:  recommend toradol atc, tylenol atc, oxy prn.   2  CV:  recommend maintaining BP <160/110, VSS  3. Pulm: recommend encouraging ISS & Ambulation  4. GI: regular diet  5. : Voiding spontaneously   6. DVT ppx: subq heparin 5000 BID

## 2021-03-04 DIAGNOSIS — Z3A.39 39 WEEKS GESTATION OF PREGNANCY: ICD-10-CM

## 2021-03-04 DIAGNOSIS — T50.995A ADVERSE EFFECT OF OTHER DRUGS, MEDICAMENTS AND BIOLOGICAL SUBSTANCES, INITIAL ENCOUNTER: ICD-10-CM

## 2021-03-04 DIAGNOSIS — R47.81 SLURRED SPEECH: ICD-10-CM

## 2021-03-04 DIAGNOSIS — H55.09 OTHER FORMS OF NYSTAGMUS: ICD-10-CM

## 2021-03-04 DIAGNOSIS — R03.0 ELEVATED BLOOD-PRESSURE READING, WITHOUT DIAGNOSIS OF HYPERTENSION: ICD-10-CM

## 2021-03-04 DIAGNOSIS — G45.9 TRANSIENT CEREBRAL ISCHEMIC ATTACK, UNSPECIFIED: ICD-10-CM
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
## 2022-08-18 NOTE — PATIENT PROFILE OB - HEIGHT IN FEET
PAST MEDICAL HISTORY:  Anxiety     Asthma     Breast cancer in situ, left     COPD (chronic obstructive pulmonary disease)     DVT (deep venous thrombosis) history of- not presently on A/C    Graves disease     Hyperlipidemia     Hypertension     Hypothyroid     Kidney cancer, primary, with metastasis from kidney to other site, left LEft sided- s/p nephrectomy only- no chemo or RT    Obesity     Sleep apnea     
5

## 2022-09-06 PROBLEM — Z00.00 ENCOUNTER FOR PREVENTIVE HEALTH EXAMINATION: Status: ACTIVE | Noted: 2022-09-06

## 2022-09-14 ENCOUNTER — APPOINTMENT (OUTPATIENT)
Dept: ENDOCRINOLOGY | Facility: CLINIC | Age: 32
End: 2022-09-14

## 2022-09-14 VITALS
BODY MASS INDEX: 26.84 KG/M2 | HEART RATE: 77 BPM | HEIGHT: 66 IN | WEIGHT: 167 LBS | SYSTOLIC BLOOD PRESSURE: 112 MMHG | DIASTOLIC BLOOD PRESSURE: 67 MMHG

## 2022-09-14 DIAGNOSIS — E03.9 HYPOTHYROIDISM, UNSPECIFIED: ICD-10-CM

## 2022-09-14 PROCEDURE — 99205 OFFICE O/P NEW HI 60 MIN: CPT | Mod: GC

## 2022-09-14 NOTE — ASSESSMENT
[FreeTextEntry1] : 32 year old female with hypothyroidism\par \par \par #Hypothyroidism\par Hx of hypothyroidism since 2019\par Currently 32 weeks pregnant\par Monthly TSH checks with OB\par Aug 22 - TSH: 0.4 in range of third trimester\par Will keep patient on current dosage of 100 mcg\par Advised to stay on current dosage\par WIll likely need to go down to 88 mcg postdelivery but will recheck labs at that time before deciding. \par Will check TSH, Free T4 and T4 for our records. \par Will call patient to discuss labwork\par \par \par discussed case with Dr. Gastelum

## 2022-09-14 NOTE — PHYSICAL EXAM
[Alert] : alert [Well Nourished] : well nourished [No Acute Distress] : no acute distress [Well Developed] : well developed [Normal Sclera/Conjunctiva] : normal sclera/conjunctiva [EOMI] : extra ocular movement intact [No Proptosis] : no proptosis [Normal Oropharynx] : the oropharynx was normal [Thyroid Not Enlarged] : the thyroid was not enlarged [No Thyroid Nodules] : no palpable thyroid nodules [No Respiratory Distress] : no respiratory distress [No Accessory Muscle Use] : no accessory muscle use [Clear to Auscultation] : lungs were clear to auscultation bilaterally [Normal S1, S2] : normal S1 and S2 [Normal Rate] : heart rate was normal [Regular Rhythm] : with a regular rhythm [No Edema] : no peripheral edema [Pedal Pulses Normal] : the pedal pulses are present [Normal Bowel Sounds] : normal bowel sounds [Not Tender] : non-tender [Not Distended] : not distended [Soft] : abdomen soft [Normal Anterior Cervical Nodes] : no anterior cervical lymphadenopathy [Normal Posterior Cervical Nodes] : no posterior cervical lymphadenopathy [No Spinal Tenderness] : no spinal tenderness [Spine Straight] : spine straight [No Stigmata of Cushings Syndrome] : no stigmata of Cushings Syndrome [Normal Gait] : normal gait [Normal Strength/Tone] : muscle strength and tone were normal [No Rash] : no rash [Normal Reflexes] : deep tendon reflexes were 2+ and symmetric [No Tremors] : no tremors [Oriented x3] : oriented to person, place, and time [Acanthosis Nigricans] : no acanthosis nigricans [de-identified] : Pregnant- 32 weeks  [de-identified] : p

## 2022-09-14 NOTE — HISTORY OF PRESENT ILLNESS
[FreeTextEntry1] : 32 year old female presenting to establish care for hypothyroidism\par \par PMHx: Hypothyroidism\par \par Diagnosed in 2019 prior to IVF treatments. She got pregnant successfully and was followed by Endocrinology at [facility] to manage the thyroid. She reports being maintained at a dosage of 88 mcg post pregnancy and was increased to 100mcg prior to getting pregnancy in Feb 2022.\par During the time of the visit she is 32 weeks pregnant. \par She has been getting her TSH checked monthly by her OB and they have been in range through each semester.\par She denies ever experiencing any symptoms of hypothyroidism, no skin, hair nail changes, excessive fatigue or fogginess. \par Denies constipation or diarrhea, denies palpitations or bradycardia. \par Her pregnancies have been uncomplicated thus far. \par She is well maintained on the current dose of 100mcg.\par \par FH: mother has hashimoto's \par SH: non-smoker non-drinker\par \par \par

## 2022-09-14 NOTE — END OF VISIT
[FreeTextEntry3] : Endocrinology Attending Attestation \par \par The case was reviewed and discussed with endocrinology fellow, Dr. Cheri MD. I saw, examined and evaluated the patient. I agree with the history, physical exam, assessment and plan of care of Dr. Alonzo. Briefly, this is a 31 y/o female with h/o hypothyroidism now 32 weeks pregnant. Of note her tsh has been in target during pregnancy on synthroid 100 mcg PO daily. We will check thyroid labs as per Dr. Alonzo’s note. Rest of plan as outlined in Dr. Alonzo's note.   \par \par Sierra Gastelum MD

## 2022-09-20 LAB
T4 FREE SERPL-MCNC: 1.3 NG/DL
T4 SERPL-MCNC: 13.9 UG/DL
THYROGLOB AB SERPL-ACNC: <20 IU/ML
THYROPEROXIDASE AB SERPL IA-ACNC: <10 IU/ML
TSH SERPL-ACNC: 0.58 UIU/ML

## 2022-11-01 ENCOUNTER — TRANSCRIPTION ENCOUNTER (OUTPATIENT)
Age: 32
End: 2022-11-01

## 2022-11-02 ENCOUNTER — OUTPATIENT (OUTPATIENT)
Dept: OUTPATIENT SERVICES | Facility: HOSPITAL | Age: 32
LOS: 1 days | End: 2022-11-02
Payer: COMMERCIAL

## 2022-11-02 ENCOUNTER — INPATIENT (INPATIENT)
Facility: HOSPITAL | Age: 32
LOS: 1 days | Discharge: HOME | End: 2022-11-04
Attending: OBSTETRICS & GYNECOLOGY | Admitting: OBSTETRICS & GYNECOLOGY
Payer: COMMERCIAL

## 2022-11-02 ENCOUNTER — RESULT REVIEW (OUTPATIENT)
Age: 32
End: 2022-11-02

## 2022-11-02 VITALS — HEIGHT: 66 IN | WEIGHT: 173.06 LBS

## 2022-11-02 DIAGNOSIS — Z3A.00 WEEKS OF GESTATION OF PREGNANCY NOT SPECIFIED: ICD-10-CM

## 2022-11-02 DIAGNOSIS — O26.899 OTHER SPECIFIED PREGNANCY RELATED CONDITIONS, UNSPECIFIED TRIMESTER: ICD-10-CM

## 2022-11-02 DIAGNOSIS — Z01.818 ENCOUNTER FOR OTHER PREPROCEDURAL EXAMINATION: ICD-10-CM

## 2022-11-02 LAB
APTT BLD: 26.3 SEC — LOW (ref 27.5–35.5)
BASOPHILS # BLD AUTO: 0.03 K/UL — SIGNIFICANT CHANGE UP (ref 0–0.2)
BASOPHILS NFR BLD AUTO: 0.3 % — SIGNIFICANT CHANGE UP (ref 0–2)
BLD GP AB SCN SERPL QL: NEGATIVE — SIGNIFICANT CHANGE UP
BLD GP AB SCN SERPL QL: NEGATIVE — SIGNIFICANT CHANGE UP
EOSINOPHIL # BLD AUTO: 0.02 K/UL — SIGNIFICANT CHANGE UP (ref 0–0.5)
EOSINOPHIL NFR BLD AUTO: 0.2 % — SIGNIFICANT CHANGE UP (ref 0–6)
HCT VFR BLD CALC: 39.6 % — SIGNIFICANT CHANGE UP (ref 34.5–45)
HCT VFR BLD CALC: 40.6 % — SIGNIFICANT CHANGE UP (ref 34.5–45)
HGB BLD-MCNC: 13.5 G/DL — SIGNIFICANT CHANGE UP (ref 11.5–15.5)
HGB BLD-MCNC: 13.8 G/DL — SIGNIFICANT CHANGE UP (ref 11.5–15.5)
IMM GRANULOCYTES NFR BLD AUTO: 0.8 % — SIGNIFICANT CHANGE UP (ref 0–0.9)
INR BLD: 0.89 — SIGNIFICANT CHANGE UP (ref 0.88–1.16)
LYMPHOCYTES # BLD AUTO: 0.9 K/UL — LOW (ref 1–3.3)
LYMPHOCYTES # BLD AUTO: 7.6 % — LOW (ref 13–44)
MCHC RBC-ENTMCNC: 32.2 PG — SIGNIFICANT CHANGE UP (ref 27–34)
MCHC RBC-ENTMCNC: 32.5 PG — SIGNIFICANT CHANGE UP (ref 27–34)
MCHC RBC-ENTMCNC: 33.3 GM/DL — SIGNIFICANT CHANGE UP (ref 32–36)
MCHC RBC-ENTMCNC: 34.8 GM/DL — SIGNIFICANT CHANGE UP (ref 32–36)
MCV RBC AUTO: 93.4 FL — SIGNIFICANT CHANGE UP (ref 80–100)
MCV RBC AUTO: 96.9 FL — SIGNIFICANT CHANGE UP (ref 80–100)
MONOCYTES # BLD AUTO: 0.97 K/UL — HIGH (ref 0–0.9)
MONOCYTES NFR BLD AUTO: 8.2 % — SIGNIFICANT CHANGE UP (ref 2–14)
NEUTROPHILS # BLD AUTO: 9.88 K/UL — HIGH (ref 1.8–7.4)
NEUTROPHILS NFR BLD AUTO: 82.9 % — HIGH (ref 43–77)
NRBC # BLD: 0 /100 WBCS — SIGNIFICANT CHANGE UP (ref 0–0)
NRBC # BLD: 0 /100 WBCS — SIGNIFICANT CHANGE UP (ref 0–0)
PLATELET # BLD AUTO: 267 K/UL — SIGNIFICANT CHANGE UP (ref 150–400)
PLATELET # BLD AUTO: 268 K/UL — SIGNIFICANT CHANGE UP (ref 150–400)
PROTHROM AB SERPL-ACNC: 10.6 SEC — SIGNIFICANT CHANGE UP (ref 10.5–13.4)
RBC # BLD: 4.19 M/UL — SIGNIFICANT CHANGE UP (ref 3.8–5.2)
RBC # BLD: 4.24 M/UL — SIGNIFICANT CHANGE UP (ref 3.8–5.2)
RBC # FLD: 11.5 % — SIGNIFICANT CHANGE UP (ref 10.3–14.5)
RBC # FLD: 11.5 % — SIGNIFICANT CHANGE UP (ref 10.3–14.5)
RH IG SCN BLD-IMP: POSITIVE — SIGNIFICANT CHANGE UP
RH IG SCN BLD-IMP: POSITIVE — SIGNIFICANT CHANGE UP
SARS-COV-2 RNA SPEC QL NAA+PROBE: DETECTED
WBC # BLD: 11.89 K/UL — HIGH (ref 3.8–10.5)
WBC # BLD: 12.15 K/UL — HIGH (ref 3.8–10.5)
WBC # FLD AUTO: 11.89 K/UL — HIGH (ref 3.8–10.5)
WBC # FLD AUTO: 12.15 K/UL — HIGH (ref 3.8–10.5)

## 2022-11-02 PROCEDURE — 85610 PROTHROMBIN TIME: CPT

## 2022-11-02 PROCEDURE — 88307 TISSUE EXAM BY PATHOLOGIST: CPT | Mod: 26

## 2022-11-02 PROCEDURE — 85027 COMPLETE CBC AUTOMATED: CPT

## 2022-11-02 PROCEDURE — 85730 THROMBOPLASTIN TIME PARTIAL: CPT

## 2022-11-02 PROCEDURE — 86780 TREPONEMA PALLIDUM: CPT

## 2022-11-02 DEVICE — INTERCEED 3 X 4": Type: IMPLANTABLE DEVICE | Status: FUNCTIONAL

## 2022-11-02 RX ORDER — CHLORHEXIDINE GLUCONATE 213 G/1000ML
1 SOLUTION TOPICAL DAILY
Refills: 0 | Status: DISCONTINUED | OUTPATIENT
Start: 2022-11-02 | End: 2022-11-02

## 2022-11-02 RX ORDER — OXYTOCIN 10 UNIT/ML
333.33 VIAL (ML) INJECTION
Qty: 20 | Refills: 0 | Status: DISCONTINUED | OUTPATIENT
Start: 2022-11-02 | End: 2022-11-02

## 2022-11-02 RX ORDER — OXYCODONE HYDROCHLORIDE 5 MG/1
5 TABLET ORAL ONCE
Refills: 0 | Status: DISCONTINUED | OUTPATIENT
Start: 2022-11-02 | End: 2022-11-04

## 2022-11-02 RX ORDER — DIPHENHYDRAMINE HCL 50 MG
25 CAPSULE ORAL EVERY 6 HOURS
Refills: 0 | Status: DISCONTINUED | OUTPATIENT
Start: 2022-11-02 | End: 2022-11-04

## 2022-11-02 RX ORDER — ONDANSETRON 8 MG/1
4 TABLET, FILM COATED ORAL EVERY 6 HOURS
Refills: 0 | Status: DISCONTINUED | OUTPATIENT
Start: 2022-11-02 | End: 2022-11-02

## 2022-11-02 RX ORDER — ACETAMINOPHEN 500 MG
975 TABLET ORAL
Refills: 0 | Status: DISCONTINUED | OUTPATIENT
Start: 2022-11-02 | End: 2022-11-04

## 2022-11-02 RX ORDER — CITRIC ACID/SODIUM CITRATE 300-500 MG
30 SOLUTION, ORAL ORAL ONCE
Refills: 0 | Status: DISCONTINUED | OUTPATIENT
Start: 2022-11-02 | End: 2022-11-02

## 2022-11-02 RX ORDER — SIMETHICONE 80 MG/1
80 TABLET, CHEWABLE ORAL EVERY 4 HOURS
Refills: 0 | Status: DISCONTINUED | OUTPATIENT
Start: 2022-11-02 | End: 2022-11-04

## 2022-11-02 RX ORDER — IBUPROFEN 200 MG
600 TABLET ORAL EVERY 6 HOURS
Refills: 0 | Status: COMPLETED | OUTPATIENT
Start: 2022-11-02 | End: 2023-10-01

## 2022-11-02 RX ORDER — OXYTOCIN 10 UNIT/ML
333.33 VIAL (ML) INJECTION
Qty: 20 | Refills: 0 | Status: DISCONTINUED | OUTPATIENT
Start: 2022-11-02 | End: 2022-11-04

## 2022-11-02 RX ORDER — SODIUM CHLORIDE 9 MG/ML
1000 INJECTION, SOLUTION INTRAVENOUS
Refills: 0 | Status: DISCONTINUED | OUTPATIENT
Start: 2022-11-02 | End: 2022-11-02

## 2022-11-02 RX ORDER — FAMOTIDINE 10 MG/ML
20 INJECTION INTRAVENOUS ONCE
Refills: 0 | Status: DISCONTINUED | OUTPATIENT
Start: 2022-11-02 | End: 2022-11-02

## 2022-11-02 RX ORDER — LANOLIN
1 OINTMENT (GRAM) TOPICAL EVERY 6 HOURS
Refills: 0 | Status: DISCONTINUED | OUTPATIENT
Start: 2022-11-02 | End: 2022-11-04

## 2022-11-02 RX ORDER — KETOROLAC TROMETHAMINE 30 MG/ML
30 SYRINGE (ML) INJECTION EVERY 6 HOURS
Refills: 0 | Status: DISCONTINUED | OUTPATIENT
Start: 2022-11-02 | End: 2022-11-03

## 2022-11-02 RX ORDER — MAGNESIUM HYDROXIDE 400 MG/1
30 TABLET, CHEWABLE ORAL
Refills: 0 | Status: DISCONTINUED | OUTPATIENT
Start: 2022-11-02 | End: 2022-11-04

## 2022-11-02 RX ORDER — TETANUS TOXOID, REDUCED DIPHTHERIA TOXOID AND ACELLULAR PERTUSSIS VACCINE, ADSORBED 5; 2.5; 8; 8; 2.5 [IU]/.5ML; [IU]/.5ML; UG/.5ML; UG/.5ML; UG/.5ML
0.5 SUSPENSION INTRAMUSCULAR ONCE
Refills: 0 | Status: DISCONTINUED | OUTPATIENT
Start: 2022-11-02 | End: 2022-11-04

## 2022-11-02 RX ORDER — SODIUM CHLORIDE 9 MG/ML
1000 INJECTION, SOLUTION INTRAVENOUS
Refills: 0 | Status: DISCONTINUED | OUTPATIENT
Start: 2022-11-02 | End: 2022-11-04

## 2022-11-02 RX ORDER — LEVOTHYROXINE SODIUM 125 MCG
100 TABLET ORAL DAILY
Refills: 0 | Status: DISCONTINUED | OUTPATIENT
Start: 2022-11-02 | End: 2022-11-02

## 2022-11-02 RX ORDER — NALOXONE HYDROCHLORIDE 4 MG/.1ML
0.1 SPRAY NASAL
Refills: 0 | Status: DISCONTINUED | OUTPATIENT
Start: 2022-11-02 | End: 2022-11-02

## 2022-11-02 RX ORDER — OXYCODONE HYDROCHLORIDE 5 MG/1
5 TABLET ORAL
Refills: 0 | Status: DISCONTINUED | OUTPATIENT
Start: 2022-11-02 | End: 2022-11-04

## 2022-11-02 RX ORDER — ENOXAPARIN SODIUM 100 MG/ML
40 INJECTION SUBCUTANEOUS EVERY 24 HOURS
Refills: 0 | Status: DISCONTINUED | OUTPATIENT
Start: 2022-11-03 | End: 2022-11-04

## 2022-11-02 RX ORDER — DEXAMETHASONE 0.5 MG/5ML
4 ELIXIR ORAL EVERY 6 HOURS
Refills: 0 | Status: DISCONTINUED | OUTPATIENT
Start: 2022-11-02 | End: 2022-11-02

## 2022-11-02 RX ORDER — SODIUM CHLORIDE 9 MG/ML
1000 INJECTION, SOLUTION INTRAVENOUS ONCE
Refills: 0 | Status: COMPLETED | OUTPATIENT
Start: 2022-11-02 | End: 2022-11-02

## 2022-11-02 RX ORDER — CEFAZOLIN SODIUM 1 G
2000 VIAL (EA) INJECTION ONCE
Refills: 0 | Status: COMPLETED | OUTPATIENT
Start: 2022-11-02 | End: 2022-11-02

## 2022-11-02 RX ADMIN — SODIUM CHLORIDE 2000 MILLILITER(S): 9 INJECTION, SOLUTION INTRAVENOUS at 17:30

## 2022-11-02 RX ADMIN — Medication 100 MILLIGRAM(S): at 20:50

## 2022-11-02 RX ADMIN — Medication 1000 MILLIUNIT(S)/MIN: at 23:57

## 2022-11-02 RX ADMIN — SODIUM CHLORIDE 125 MILLILITER(S): 9 INJECTION, SOLUTION INTRAVENOUS at 17:50

## 2022-11-02 NOTE — PRE-ANESTHESIA EVALUATION ADULT - NSATTENDATTESTRD_GEN_ALL_CORE
Subjective   Matt Burch is a 6 y.o. male is here today for medication management follow-up.    Chief Complaint:  ADHD    History of Present Illness:    Patient presents today for follow-up for medication management for ADHD with guardian.  Patient is being seen with guardian due to age and presentation. Patient states doing good overall.  Patient reports still having some difficulty with focus and sitting still.  Guardian reports he still has periods of hyperactivity.  Patient states school is going good and grades are good. Patient states sleeping good and going to bed at 3 am then up at 9 am. Denies any nightmares.  Guardian reports he has been staying up later playing games due to online school.  Patient states he has small red bumps but he has been taking benadryl.  Guardian reports she believes he is allergic to laundry detergent.  Patient states appetite is good and eating three meals a day. Patient denies any depression, sadness, or anxiety. Patient denies any aggression or fighting. Patient denies any thoughts harm self or others.  Guardian denies any medical changes since last visit.   The following portions of the patient's history were reviewed and updated as appropriate: allergies, current medications, past family history, past medical history, past social history, past surgical history and problem list.    Review of Systems   Constitutional: Negative.    Respiratory: Negative.    Cardiovascular: Negative.    Gastrointestinal: Negative.    Neurological: Negative.    Psychiatric/Behavioral: Positive for decreased concentration. The patient is hyperactive.        Objective   Physical Exam  Vitals signs reviewed.   Constitutional:       General: He is active.      Appearance: Normal appearance. He is well-developed and well-groomed.   Neurological:      Mental Status: He is alert.   Psychiatric:         Attention and Perception: Perception normal. He is inattentive.         Mood and Affect: Mood  "and affect normal.         Speech: Speech normal.         Behavior: Behavior is hyperactive. Behavior is cooperative.         Thought Content: Thought content normal.         Cognition and Memory: Cognition and memory normal.         Judgment: Judgment is impulsive.       Blood pressure 102/60, pulse 87, height 127.6 cm (50.25\"), weight 25.4 kg (56 lb). Body mass index is 15.59 kg/m².    No Known Allergies    Medication List:   Current Outpatient Medications   Medication Sig Dispense Refill   • acetaminophen (TYLENOL) 100 MG/ML solution Take  by mouth every 4 (four) hours as needed for fever (5 ml).     • Cetirizine HCl (ZYRTEC PO) Take 5 mL by mouth every night.     • Methylphenidate HCl ER 36 MG tablet sustained-release 24 hour Take 1 tablet by mouth Daily. 30 tablet 0     No current facility-administered medications for this visit.        Mental Status Exam:   Hygiene:   good  Cooperation:  Cooperative  Eye Contact:  Fair  Psychomotor Behavior:  Hyperactive  Affect:  Appropriate  Hopelessness: Denies  Speech:  Normal  Thought Process:  Goal directed and Linear  Thought Content:  Normal  Suicidal:  None  Homicidal:  None  Hallucinations:  None  Delusion:  None  Memory:  Intact  Orientation:  Person, Place, Time and Situation  Reliability:  fair  Insight:  Fair  Judgement:  Poor  Impulse Control:  Poor  Physical/Medical Issues:  No               Assessment/Plan   Diagnoses and all orders for this visit:    1. Attention deficit hyperactivity disorder, combined type  -     Methylphenidate HCl ER 36 MG tablet sustained-release 24 hour; Take 1 tablet by mouth Daily.  Dispense: 30 tablet; Refill: 0            Discussed medication options with guardian and patient. Cont. Concerta 36mg daily for ADHD.  Reviewed the risks, benefits, and side effects of the medications; patient and guardian acknowledged and verbally consented. Patient's caregivers were provided education regarding treatment and treatment options.  Extensive " education is provided regarding stimulants. Cardiac risk, risk of growth delay, weight loss, and cardiac issues.Patient is being prescribed a controlled substance as part of treatment plan.   [facility] Patient Controlled Substance Report (from 2/13/2020 to 2/9/2021)    Dispensed  Strength Quantity Days Supply Provider Pharmacy   02/04/2021 Methylphenidate Hydrochlo 36MG 30 each 30 [facility], [facility] [place]   01/05/2021 Methylphenidate Hydrochlo 36MG 30 each 30 [facility] [place]   12/03/2020 Methylphenidate Hydrochlo 36MG 30 each 30 [facility], JENA [facility] [place]   11/02/2020 Methylphenidate Hydrochlo 36MG 30 each 30 [facility], LEYLA [facility] [place]   10/06/2020 Methylphenidate Hydrochlo 36MG 30 each 30 [facility], LEYLA [facility] [place]   08/27/2020 Methylphenidate Hydro[facility]lo [facility]MG 30 [facility]   08/18/2020 Methylphenidate Hydrochlo 27MG 30 each [facility] [place]   07/16/2020 Methylphenidate Hydrochlo 27MG 30 each [facility] [place]   06/15/2020 Methylphenidate Hydrochlo 27MG 30 each 30 [facility], LEYLA [facility] [place]   05/13/2020 Methylphenidate Hydrochlo 27MG 30 each 30 [facility] [place]   04/13/2020 Methylphenidate Hydrochlo 27MG 30 each 30 [facility], [facility] [place]   03/13/2020 Methylphenidate Hydrochlo 27MG 30 each [facility]           Patient and guardian is agreeable to call the office with any questions, concerns, or worsening of symptoms.  Patient and guardian is aware to call 911 or go to the nearest ER should begin having SI/HI.        Follow-up in 4 weeks        Errors in dictation may reflect use of voice recognition software and not all errors in transcription may have been detected prior to signing.              This document has been electronically signed by MEGAN Wilkins   February 9, 2021 15:03 EST   The patient has been re-examined and I agree with the above assessment or I updated with my findings.

## 2022-11-02 NOTE — PATIENT PROFILE OB - FALL HARM RISK - PT AGE POPULATION HIDDEN
Patient presents for 1day s/p IOL/Vit RE. Presents in patch and smith eye shield. Patient had a bit of pain yesterday, took OTC pain relievers to help. The eye felt scratchy and weepy last night, patient was concerned. When patch was removed patient could see well. The eye feels fine, the lid feels a little stiff and sore. The eye appears red with discharge removed by technician. Razia Bass COT 8:21 AM January 9, 2019   
Breath sounds clear and equal bilaterally.
Adult

## 2022-11-02 NOTE — PRE-ANESTHESIA EVALUATION ADULT - NSANTHPMHFT_GEN_ALL_CORE
Cardiac: Denies HTN, HLD, MI/Angina/Heart Failure, Valvular disorder/Murmur, Arrhythmia, >4 METS  Pulmonary: Denies Asthma, COPD, SHAWN  Renal: Denies kidney dysfunction  Hepatic: Denies liver dysfunction  Endocrine: Denies diabetes. Positive for hypothyroidism. Prior gestational diabetes with first pregnancy.  Gastrointestinal: Mild GERD symptoms.  Neurologic: Denies stroke/seizure history. Acute dystonic reaction to metoclopramide during first  section.  Hematologic: Denies anemia, blood clotting disorder    PSH:  Section, [place] teeth removal

## 2022-11-02 NOTE — PRE-ANESTHESIA EVALUATION ADULT - MALLAMPATI CLASS
Patient examined, record reviewed, agree with findings and recommendations as documented above.   Class II - visualization of the soft palate, fauces, and uvula

## 2022-11-03 LAB
BASOPHILS # BLD AUTO: 0.02 K/UL — SIGNIFICANT CHANGE UP (ref 0–0.2)
BASOPHILS NFR BLD AUTO: 0.1 % — SIGNIFICANT CHANGE UP (ref 0–2)
COVID-19 SPIKE DOMAIN AB INTERP: POSITIVE
COVID-19 SPIKE DOMAIN ANTIBODY RESULT: >250 U/ML — HIGH
EOSINOPHIL # BLD AUTO: 0 K/UL — SIGNIFICANT CHANGE UP (ref 0–0.5)
EOSINOPHIL NFR BLD AUTO: 0 % — SIGNIFICANT CHANGE UP (ref 0–6)
HCT VFR BLD CALC: 33.9 % — LOW (ref 34.5–45)
HGB BLD-MCNC: 11.4 G/DL — LOW (ref 11.5–15.5)
IMM GRANULOCYTES NFR BLD AUTO: 0.6 % — SIGNIFICANT CHANGE UP (ref 0–0.9)
LYMPHOCYTES # BLD AUTO: 0.54 K/UL — LOW (ref 1–3.3)
LYMPHOCYTES # BLD AUTO: 3.8 % — LOW (ref 13–44)
MCHC RBC-ENTMCNC: 32.3 PG — SIGNIFICANT CHANGE UP (ref 27–34)
MCHC RBC-ENTMCNC: 33.6 GM/DL — SIGNIFICANT CHANGE UP (ref 32–36)
MCV RBC AUTO: 96 FL — SIGNIFICANT CHANGE UP (ref 80–100)
MONOCYTES # BLD AUTO: 0.42 K/UL — SIGNIFICANT CHANGE UP (ref 0–0.9)
MONOCYTES NFR BLD AUTO: 3 % — SIGNIFICANT CHANGE UP (ref 2–14)
NEUTROPHILS # BLD AUTO: 13.12 K/UL — HIGH (ref 1.8–7.4)
NEUTROPHILS NFR BLD AUTO: 92.5 % — HIGH (ref 43–77)
NRBC # BLD: 0 /100 WBCS — SIGNIFICANT CHANGE UP (ref 0–0)
PLATELET # BLD AUTO: 220 K/UL — SIGNIFICANT CHANGE UP (ref 150–400)
RBC # BLD: 3.53 M/UL — LOW (ref 3.8–5.2)
RBC # FLD: 11.6 % — SIGNIFICANT CHANGE UP (ref 10.3–14.5)
SARS-COV-2 IGG+IGM SERPL QL IA: >250 U/ML — HIGH
SARS-COV-2 IGG+IGM SERPL QL IA: POSITIVE
T PALLIDUM AB TITR SER: NEGATIVE — SIGNIFICANT CHANGE UP
T PALLIDUM AB TITR SER: NEGATIVE — SIGNIFICANT CHANGE UP
WBC # BLD: 14.19 K/UL — HIGH (ref 3.8–10.5)
WBC # FLD AUTO: 14.19 K/UL — HIGH (ref 3.8–10.5)

## 2022-11-03 RX ORDER — SODIUM CHLORIDE 0.65 %
1 AEROSOL, SPRAY (ML) NASAL THREE TIMES A DAY
Refills: 0 | Status: DISCONTINUED | OUTPATIENT
Start: 2022-11-03 | End: 2022-11-04

## 2022-11-03 RX ORDER — LEVOTHYROXINE SODIUM 125 MCG
100 TABLET ORAL DAILY
Refills: 0 | Status: DISCONTINUED | OUTPATIENT
Start: 2022-11-03 | End: 2022-11-04

## 2022-11-03 RX ADMIN — Medication 1 SPRAY(S): at 19:07

## 2022-11-03 RX ADMIN — Medication 100 MICROGRAM(S): at 06:49

## 2022-11-03 RX ADMIN — MAGNESIUM HYDROXIDE 30 MILLILITER(S): 400 TABLET, CHEWABLE ORAL at 17:19

## 2022-11-03 RX ADMIN — Medication 30 MILLIGRAM(S): at 01:00

## 2022-11-03 RX ADMIN — ENOXAPARIN SODIUM 40 MILLIGRAM(S): 100 INJECTION SUBCUTANEOUS at 12:46

## 2022-11-03 RX ADMIN — Medication 30 MILLIGRAM(S): at 07:16

## 2022-11-03 RX ADMIN — Medication 30 MILLIGRAM(S): at 06:37

## 2022-11-03 RX ADMIN — Medication 30 MILLIGRAM(S): at 12:47

## 2022-11-03 RX ADMIN — Medication 30 MILLIGRAM(S): at 01:40

## 2022-11-03 RX ADMIN — Medication 30 MILLIGRAM(S): at 13:47

## 2022-11-03 RX ADMIN — Medication 30 MILLIGRAM(S): at 17:19

## 2022-11-03 RX ADMIN — Medication 30 MILLIGRAM(S): at 18:42

## 2022-11-03 NOTE — PROGRESS NOTE ADULT - ASSESSMENT
A/P   32y  s/p  section, POD #1, stable  -  Pain: PO motrin q6h, Tylenol q6h, oxycodone for severe pain PRN  -  Post-operatively labs: post-op Hgb  -  GI: tolerating clears, passing gas, ADAT  -  : masters  -  DVT prophylaxis: ambulation, SCDs, Lovenox  -  Dispo: POD 3 or 4 negative...

## 2022-11-03 NOTE — LACTATION INITIAL EVALUATION - NS LACT CON REASON FOR REQ
dyad seen 17hrs post r/c/s. 2voids/1stool. mom covid +.  mother reports she bf her first child for 5M with no issues. stopped breastfeeding only because she wanted to do egg retrieval for future IVF. this pregnancy was a natural conception. reviewed bfing basics, positioning techniques, feeding/satiety cues, signs of good latch, and encouraged s2s contact. taught hand expression with return demo. easily expressible colostrum. + latch in laid back cradle on L breast for duration of consult. advised responsive feeding 8-12x/day./multiparous mom

## 2022-11-04 ENCOUNTER — TRANSCRIPTION ENCOUNTER (OUTPATIENT)
Age: 32
End: 2022-11-04

## 2022-11-04 VITALS
RESPIRATION RATE: 18 BRPM | DIASTOLIC BLOOD PRESSURE: 69 MMHG | TEMPERATURE: 98 F | OXYGEN SATURATION: 98 % | HEART RATE: 68 BPM | SYSTOLIC BLOOD PRESSURE: 106 MMHG

## 2022-11-04 PROCEDURE — 85025 COMPLETE CBC W/AUTO DIFF WBC: CPT

## 2022-11-04 PROCEDURE — 87635 SARS-COV-2 COVID-19 AMP PRB: CPT

## 2022-11-04 PROCEDURE — 86780 TREPONEMA PALLIDUM: CPT

## 2022-11-04 PROCEDURE — 86901 BLOOD TYPING SEROLOGIC RH(D): CPT

## 2022-11-04 PROCEDURE — 86900 BLOOD TYPING SEROLOGIC ABO: CPT

## 2022-11-04 PROCEDURE — 59050 FETAL MONITOR W/REPORT: CPT

## 2022-11-04 PROCEDURE — 88307 TISSUE EXAM BY PATHOLOGIST: CPT

## 2022-11-04 PROCEDURE — 99214 OFFICE O/P EST MOD 30 MIN: CPT

## 2022-11-04 PROCEDURE — 86769 SARS-COV-2 COVID-19 ANTIBODY: CPT

## 2022-11-04 PROCEDURE — 36415 COLL VENOUS BLD VENIPUNCTURE: CPT

## 2022-11-04 PROCEDURE — 86850 RBC ANTIBODY SCREEN: CPT

## 2022-11-04 PROCEDURE — C1765: CPT

## 2022-11-04 RX ORDER — ACETAMINOPHEN 500 MG
3 TABLET ORAL
Qty: 0 | Refills: 0 | DISCHARGE
Start: 2022-11-04

## 2022-11-04 RX ORDER — IBUPROFEN 200 MG
600 TABLET ORAL EVERY 6 HOURS
Refills: 0 | Status: DISCONTINUED | OUTPATIENT
Start: 2022-11-04 | End: 2022-11-04

## 2022-11-04 RX ORDER — IBUPROFEN 200 MG
1 TABLET ORAL
Qty: 0 | Refills: 0 | DISCHARGE
Start: 2022-11-04

## 2022-11-04 RX ORDER — LEVOTHYROXINE SODIUM 125 MCG
1 TABLET ORAL
Qty: 0 | Refills: 0 | DISCHARGE

## 2022-11-04 RX ADMIN — Medication 600 MILLIGRAM(S): at 12:21

## 2022-11-04 RX ADMIN — Medication 600 MILLIGRAM(S): at 00:30

## 2022-11-04 RX ADMIN — Medication 600 MILLIGRAM(S): at 07:00

## 2022-11-04 RX ADMIN — ENOXAPARIN SODIUM 40 MILLIGRAM(S): 100 INJECTION SUBCUTANEOUS at 12:21

## 2022-11-04 RX ADMIN — Medication 600 MILLIGRAM(S): at 05:50

## 2022-11-04 RX ADMIN — Medication 100 MICROGRAM(S): at 06:35

## 2022-11-04 RX ADMIN — Medication 600 MILLIGRAM(S): at 01:30

## 2022-11-04 NOTE — DISCHARGE NOTE OB - HOSPITAL COURSE
Pt is a 32yF s/p c-sx.  Please see delivery note for details.  During postpartum course patient's vitals were stable, vaginal bleeding appropriate, and pain well controlled.  On day of discharge patient was ambulating, pt had adequate oral intake, and was voiding freely.  Discharge instructions and precautions were given.  Will return to clinic in 1-2 weeks for incision check.

## 2022-11-04 NOTE — DISCHARGE NOTE OB - CARE PROVIDER_API CALL
Roxanna Gao)  Obstetrics and Gynecology  [address] [place] [id number]  Phone: [phone]  Fax: [phone]  Follow Up Time:

## 2022-11-04 NOTE — DISCHARGE NOTE OB - CARE PLAN
1 Principal Discharge DX:	Postpartum state  Assessment and plan of treatment:	Take Motrin 600mg every 6 hours and/or tylenol 650mg every 6 hours as needed for pain. Call your Doctor  to schedule a follow up appointment for 2 weeks. Call your Doctor if you experience severe abdominal pain not improved by oral pain medications, heavy bright red vaginal bleeding saturating more than 1 pad per hour, or fever greater than 100.4F.

## 2022-11-04 NOTE — DISCHARGE NOTE OB - NS MD DC FALL RISK RISK
For information on Fall & Injury Prevention, visit: [email or web address] OR  [email or web address] OR  https://www.cdc.gov/steadi/patient.html

## 2022-11-04 NOTE — PROGRESS NOTE ADULT - ASSESSMENT
A/P: 32y POD2 s/p repeat  section c/b COVID19, asymptomatic. Pt is hemodynamically stable.   -  Neuro-Pain control with motrin/acetaminophen, oxycodone for severe pain PRN  -  Cardio: no issues. Continue to monitor routinely   -  GI: Reg diet, Reglan/Zofran prn   -  : s/p masters- voiding  -  DVT prophylaxis: Lovenox 40mg qd, SCDs  -  Activity- ambulate as tolerated   -  Heme: post-op hemoglobin 11.4 A/P: 32y POD2 s/p repeat  section c/b COVID19, asymptomatic. Pt is hemodynamically stable.   -  Neuro-Pain control with motrin/acetaminophen, oxycodone for severe pain PRN  -  Cardio: no issues. Continue to monitor routinely   -  GI: Reg diet, Reglan/Zofran prn   -  : s/p masters- voiding  -  DVT prophylaxis: Lovenox 40mg qd, SCDs  -  Activity- ambulate as tolerated   -  Heme: post-op hemoglobin 11.4    attending note:  pt seen and examined. doing well.   vssaf  ICU Vital Signs Last 24 Hrs  T(C): 36.9 (2022 06:50), Max: 36.9 (2022 06:50)  T(F): 98.4 (2022 06:50), Max: 98.4 (2022 06:50)  HR: 68 (2022 06:50) (68 - 77)  BP: 106/69 (2022 06:50) (100/63 - 106/69)  BP(mean): --  ABP: --  ABP(mean): --  RR: 18 (2022 06:50) (18 - 18)  SpO2: 98% (2022 06:50) (98% - 98%)    O2 Parameters below as of 2022 06:50  Patient On (Oxygen Delivery Method): room air          abd soft and nd  incision c/d/i  neg calf tenderness                                   11.4   14.19 )-----------( 220      ( 2022 05:30 )             33.9     POD2 doing well  oob  reg diet  -dr colby castorena

## 2022-11-04 NOTE — DISCHARGE NOTE OB - DO NOT DIET OR “STARVE” YOURSELF INTO GETTING BACK TO PRE-PREGNANCY SHAPE
----- Message from Michell Rhodes NP sent at 12/11/2020  2:27 PM CST -----  Bone density looks good  Continued daily calcium and vitamin D supplement is encouraged to help maintain this.  
Spoke with patient and she is aware the dexa scan looks good. Aware to continue on vitamin d and calcium supplement.  
Statement Selected

## 2022-11-04 NOTE — PROGRESS NOTE ADULT - SUBJECTIVE AND OBJECTIVE BOX
Patient evaluated at bedside this morning, resting comfortable in bed, with no acute events overnight.  She reports pain is well controlled.  She denies headache, dizziness, chest pain, palpitations, shortness of breathe, nausea, vomiting or heavy vaginal bleeding.  She has not tried ambulating since procedure, masters. Tolerating clear liquids.     Physical Exam:  Vital Signs Last 24 Hrs  T(C): 36.6 (03 Nov 2022 06:40), Max: 36.9 (02 Nov 2022 22:40)  T(F): 97.8 (03 Nov 2022 06:40), Max: 98.4 (02 Nov 2022 22:40)  HR: 71 (03 Nov 2022 06:40) (71 - 108)  BP: 109/67 (03 Nov 2022 06:40) (100/60 - 131/66)  BP(mean): --  RR: 17 (03 Nov 2022 06:40) (15 - 18)  SpO2: 99% (03 Nov 2022 06:40) (98% - 100%)    Parameters below as of 03 Nov 2022 06:40  Patient On (Oxygen Delivery Method): room air        GA: NAD, A+0 x 3  Abd: + BS, soft, nontender, nondistended, no rebound or guarding, incision clean, dry and intact, uterus firm at midline,  below umbilicus  :  lochia WNL  Extremities: no swelling or calf tenderness, reflexes +2 bilaterally.                            11.4   14.19 )-----------( 220      ( 03 Nov 2022 05:30 )             33.9             PT/INR - ( 02 Nov 2022 13:25 )   PT: 10.6 sec;   INR: 0.89          PTT - ( 02 Nov 2022 13:25 )  PTT:26.3 sec  acetaminophen     Tablet .. 975 milliGRAM(s) Oral <User Schedule>  diphenhydrAMINE 25 milliGRAM(s) Oral every 6 hours PRN  diphtheria/tetanus/pertussis (acellular) Vaccine (ADAcel) 0.5 milliLiter(s) IntraMuscular once  enoxaparin Injectable 40 milliGRAM(s) SubCutaneous every 24 hours  ibuprofen  Tablet. 600 milliGRAM(s) Oral every 6 hours  ketorolac   Injectable 30 milliGRAM(s) IV Push every 6 hours  lactated ringers. 1000 milliLiter(s) IV Continuous <Continuous>  lanolin Ointment 1 Application(s) Topical every 6 hours PRN  levothyroxine 100 MICROGram(s) Oral daily  magnesium hydroxide Suspension 30 milliLiter(s) Oral two times a day PRN  oxyCODONE    IR 5 milliGRAM(s) Oral every 3 hours PRN  oxyCODONE    IR 5 milliGRAM(s) Oral once PRN  oxytocin Infusion 333.333 milliUNIT(s)/Min IV Continuous <Continuous>  simethicone 80 milliGRAM(s) Chew every 4 hours PRN  
Patient evaluated at bedside. No acute events overnight. She reports pain is well controlled with OPM.  She denies headache, dizziness, chest pain, palpitations, shortness of breath, nausea, vomiting or heavy vaginal bleeding.      Physical Exam:  Vital Signs Last 24 Hrs  T(C): 36.6 (03 Nov 2022 09:00), Max: 36.9 (02 Nov 2022 22:40)  T(F): 97.9 (03 Nov 2022 09:00), Max: 98.4 (02 Nov 2022 22:40)  HR: 74 (03 Nov 2022 09:00) (71 - 108)  BP: 114/87 (03 Nov 2022 09:00) (100/60 - 131/66)  BP(mean): --  RR: 18 (03 Nov 2022 09:00) (15 - 18)  SpO2: 98% (03 Nov 2022 09:00) (98% - 100%)    Parameters below as of 03 Nov 2022 09:00  Patient On (Oxygen Delivery Method): room air        GA: NAD, A+0 x 3  Abd: + BS, soft, nontender, mildly distended, no rebound or guarding, uterus firm at midline, 2 fb below umbilicus  Incision clean, dry and intact  : lochia WNL  Extremities: no  calf tenderness                            11.4   14.19 )-----------( 220      ( 03 Nov 2022 05:30 )             33.9       A/P  yo 32y s/p c/s, POD #1  ,stable    Diet: clears until flatus  Pain: OPM  OOB/SCDs/IS  Continue to monitor  Anticipate discharge POD #3 or #4      PT/INR - ( 02 Nov 2022 13:25 )   PT: 10.6 sec;   INR: 0.89          PTT - ( 02 Nov 2022 13:25 )  PTT:26.3 sec  
Patient seen and evaluated at bedside. Pt states she has mild abdominal pain, overall controlled with pain medication. She is tolerating reg diet, passing flatus, and voiding.   She denies fevers/chills, headache, dizziness, chest pain, palpitations, shortness of breath, cough/congestion, nausea, vomiting, or heavy vaginal bleeding.    Physical Exam:  Vital Signs Last 24 Hrs  T(C): 36.5 (03 Nov 2022 22:00), Max: 36.7 (03 Nov 2022 18:00)  T(F): 97.7 (03 Nov 2022 22:00), Max: 98 (03 Nov 2022 18:00)  HR: 77 (03 Nov 2022 22:00) (74 - 77)  BP: 102/66 (03 Nov 2022 22:00) (100/63 - 114/87)  RR: 18 (03 Nov 2022 22:00) (18 - 18)  SpO2: 98% (03 Nov 2022 22:00) (98% - 98%)    Parameters below as of 03 Nov 2022 22:00  Patient On (Oxygen Delivery Method): room air        GA: comfortable in NAD  Abd: soft, nontender, nondistended, no rebound or guarding, incision clean, dry and intact, w/ steri strips in place, uterus firm at midline  : lochia WNL  Extremities: no calf tenderness                            11.4   14.19 )-----------( 220      ( 03 Nov 2022 05:30 )             33.9             PT/INR - ( 02 Nov 2022 13:25 )   PT: 10.6 sec;   INR: 0.89          PTT - ( 02 Nov 2022 13:25 )  PTT:26.3 sec  acetaminophen     Tablet .. 975 milliGRAM(s) Oral <User Schedule>  diphenhydrAMINE 25 milliGRAM(s) Oral every 6 hours PRN  diphtheria/tetanus/pertussis (acellular) Vaccine (ADAcel) 0.5 milliLiter(s) IntraMuscular once  enoxaparin Injectable 40 milliGRAM(s) SubCutaneous every 24 hours  ibuprofen  Tablet. 600 milliGRAM(s) Oral every 6 hours  lactated ringers. 1000 milliLiter(s) IV Continuous <Continuous>  lanolin Ointment 1 Application(s) Topical every 6 hours PRN  levothyroxine 100 MICROGram(s) Oral daily  magnesium hydroxide Suspension 30 milliLiter(s) Oral two times a day PRN  oxyCODONE    IR 5 milliGRAM(s) Oral every 3 hours PRN  oxyCODONE    IR 5 milliGRAM(s) Oral once PRN  oxytocin Infusion 333.333 milliUNIT(s)/Min IV Continuous <Continuous>  simethicone 80 milliGRAM(s) Chew every 4 hours PRN  sodium chloride 0.65% Nasal 1 Spray(s) Both Nostrils three times a day PRN

## 2022-11-04 NOTE — DISCHARGE NOTE OB - PATIENT PORTAL LINK FT
You can access the FollowMyHealth Patient Portal offered by Health system by registering at the following website: [email or web address]. By joining [facility]’s FollowMyHealth portal, you will also be able to view your health information using other applications (apps) compatible with our system.

## 2022-11-10 LAB — SURGICAL PATHOLOGY STUDY: SIGNIFICANT CHANGE UP

## 2022-11-16 NOTE — PATIENT PROFILE OB - FUNCTIONAL ASSESSMENT - DAILY ACTIVITY 6.
PRE-SEDATION ASSESSMENT    CONSENT  Risks, benefits, and alternatives have been discussed with the patient/patient representative, and patient/patient representative agrees to proceed: Yes    MEDICAL HISTORY       PHYSICAL EXAM  History and Physical Reviewed: H&P completed today  Airway Risk History: No previous complications  Airway Anatomy : Class II  Heart : Normal  Lungs : Normal  LOC/Mental Status : Normal    OTHER FINDINGS  Reviewed current medications and allergies: Yes  Pertinent lab/diagnostic test reviewed: Yes    SEDATION RISK ASSESSMENT  Risk Status ASA: Class II - Normal patient with mild systemic disease  Plan for Sedation: Moderate Sedation  EKG Monitoring: Yes    NARRATIVE FINDINGS      4 = No assist / stand by assistance

## 2022-11-18 DIAGNOSIS — O34.219 MATERNAL CARE FOR UNSPECIFIED TYPE SCAR FROM PREVIOUS CESAREAN DELIVERY: ICD-10-CM

## 2022-11-18 DIAGNOSIS — E03.9 HYPOTHYROIDISM, UNSPECIFIED: ICD-10-CM

## 2022-11-18 DIAGNOSIS — Z3A.38 38 WEEKS GESTATION OF PREGNANCY: ICD-10-CM

## 2022-11-18 DIAGNOSIS — U07.1 COVID-19: ICD-10-CM

## 2022-11-18 DIAGNOSIS — Z14.8 GENETIC CARRIER OF OTHER DISEASE: ICD-10-CM

## 2023-01-03 RX ORDER — LEVOTHYROXINE SODIUM 88 UG/1
88 CAPSULE ORAL DAILY
Qty: 90 | Refills: 1 | Status: ACTIVE | COMMUNITY
Start: 2022-10-05 | End: 1900-01-01